# Patient Record
Sex: FEMALE | Race: WHITE | NOT HISPANIC OR LATINO | Employment: OTHER | URBAN - METROPOLITAN AREA
[De-identification: names, ages, dates, MRNs, and addresses within clinical notes are randomized per-mention and may not be internally consistent; named-entity substitution may affect disease eponyms.]

---

## 2018-04-05 ENCOUNTER — CLINICAL SUPPORT (OUTPATIENT)
Dept: GENETICS | Facility: HOSPITAL | Age: 73
End: 2018-04-05
Attending: INTERNAL MEDICINE
Payer: MEDICARE

## 2018-04-05 DIAGNOSIS — Z71.83 ENCOUNTER FOR NONPROCREATIVE GENETIC COUNSELING: Primary | ICD-10-CM

## 2018-04-05 DIAGNOSIS — Z85.038 HISTORY OF COLON CANCER: ICD-10-CM

## 2018-04-05 PROCEDURE — 96040 HC GENETICS COUNSELING SESSIONS: CPT | Mod: GY

## 2018-04-05 NOTE — PROGRESS NOTES
Patient Name:  Concepcion Romero  : 1945    Indication for Appointment:  Concepcion Romero presented for genetic counseling and cancer risk assessment due to a personal history of colorectal cancer. Concepcion Romero was referred by Jae Xiong MD and came to the session alone.    Personal History:   Concepcion Romero is 72 y.o. female of [place] descent with primary visit diagnosis of Encounter for nonprocreative genetic counseling [Z71.83].    Medical History:  Concepcion Romero  has a past medical history of Basal cell carcinoma; Colon cancer (CMS/HCC) (HCC); HBP (high blood pressure); High cholesterol; Melanoma (CMS/HCC) (HCC); Neuropathy (CMS/HCC); and Screening for breast cancer.     Past Surgical History:   Procedure Laterality Date   • BOWEL RESECTION     • COLONOSCOPY      2 total colonoscopy    • TUBAL LIGATION        Gynecologic History:  Age at menarche:  12  Age at first live birth:  28  Age at menopause:  48    Current Medications:  Concepcion Romero currently has no medications in their medication list.    Social History:  Concepcion Romero  reports that she has quit smoking. She smoked 1.00 pack per day. She has never used smokeless tobacco. She reports that she drinks alcohol.I    Family History:  See completed family history in pedigree.           Genetic Education/Risk Assessment/Counseling:  Information was provided about the relationship between genes and cancer.  The concept of hereditary cancer was defined.  Natural history, risks and inheritance patterns of  cancer-associated genes were reviewed, as related to Concepcion Romero’s personal and/or family history.  Related psychosocial aspects were discussed.    Discussion of Genetic Testing:  The pros, cons, and limitations of testing for genetic susceptibility were discussed, including but not limited to test options, possible results, potential impact on management, and psychosocial aspects.  There may be limited data on the degree of cancer  risk and/or no defined management guidelines associated with some genes.  If applicable, risk assessment models and/or published tables were used to provide a mutation probability estimate. Limitations of assessment were reviewed.    Given the reported personal and/or family history, genetic testing genetic testing was offered and accepted. The following testing was ordered:    [facility]  Colorectal Cancers Panel      Plan:     Concepcion Romero was confirmed to have understood the aforementioned information and was assisted with decision making as needed.  Informational and supportive resources were provided. Consent was obtained to share chart note(s) with physicians. Concepcion Romero is encouraged to contact the program with personal/family history updates. Concepcion Romero will be contacted via telephone when genetic test results are available.     A total of 30 minutes was spent providing genetic counseling to Concepcion Romero.

## 2018-04-05 NOTE — LETTER
04/05/18    Jae Xiong MD  [address]  [address]  [facility] [address]      Dear Dr. Xiong,    Thank you for referring your patient, Concepcion Romero, to receive care in my office. I have enclosed a summary of the care provided to Concepcion JONES on 04/05/18.    Please contact me with any questions you may have regarding the visit.    Sincerely,         Dianne Watkins, [facility]  [address] [facility] [address] [phone]    CC: Curt Kinsey MD

## 2018-04-09 ENCOUNTER — TELEPHONE (OUTPATIENT)
Dept: PRIMARY CARE | Facility: CLINIC | Age: 73
End: 2018-04-09

## 2018-04-09 RX ORDER — TRIAMTERENE/HYDROCHLOROTHIAZID 37.5-25 MG
1 TABLET ORAL DAILY
Qty: 90 TABLET | Refills: 2 | Status: SHIPPED | OUTPATIENT
Start: 2018-04-09 | End: 2018-12-31 | Stop reason: SDUPTHER

## 2018-04-09 RX ORDER — GABAPENTIN 300 MG/1
2 CAPSULE ORAL DAILY
COMMUNITY
Start: 2018-02-02 | End: 2018-04-09 | Stop reason: SDUPTHER

## 2018-04-09 RX ORDER — BISOPROLOL FUMARATE 5 MG/1
5 TABLET, FILM COATED ORAL DAILY
Qty: 90 TABLET | Refills: 2 | Status: SHIPPED | OUTPATIENT
Start: 2018-04-09 | End: 2018-12-31 | Stop reason: SDUPTHER

## 2018-04-09 RX ORDER — SIMVASTATIN 20 MG/1
1 TABLET, FILM COATED ORAL DAILY
COMMUNITY
Start: 2017-07-24 | End: 2018-04-09 | Stop reason: SDUPTHER

## 2018-04-09 RX ORDER — BISOPROLOL FUMARATE 5 MG/1
1 TABLET, FILM COATED ORAL DAILY
COMMUNITY
Start: 2017-07-24 | End: 2018-04-09 | Stop reason: SDUPTHER

## 2018-04-09 RX ORDER — GABAPENTIN 300 MG/1
600 CAPSULE ORAL DAILY
Qty: 180 CAPSULE | Refills: 2 | Status: SHIPPED | OUTPATIENT
Start: 2018-04-09 | End: 2018-05-30

## 2018-04-09 RX ORDER — SIMVASTATIN 20 MG/1
20 TABLET, FILM COATED ORAL DAILY
Qty: 90 TABLET | Refills: 2 | Status: SHIPPED | OUTPATIENT
Start: 2018-04-09 | End: 2018-12-31 | Stop reason: SDUPTHER

## 2018-04-09 RX ORDER — TRIAMTERENE/HYDROCHLOROTHIAZID 37.5-25 MG
1 TABLET ORAL DAILY
COMMUNITY
End: 2018-04-09 | Stop reason: SDUPTHER

## 2018-04-09 NOTE — TELEPHONE ENCOUNTER
Patient called today asking for Refills of meds that were RX'ed by her old [facility] Simvastatin 20 mg Once a Day - Bisoprolol- Fumarate tabs 5 mg once a day , Triamterene HCTZ 37.5/25 mg tabs once daily , Gabapentin 300mg 2 tabs once a day. all 90 day supply to [facility]

## 2018-04-17 ENCOUNTER — TELEPHONE (OUTPATIENT)
Dept: GENETICS | Facility: HOSPITAL | Age: 73
End: 2018-04-17

## 2018-04-17 NOTE — TELEPHONE ENCOUNTER
The patient did not answer the phone. I left a message asking to be called back at their earliest convenience.

## 2018-04-18 ENCOUNTER — TELEPHONE (OUTPATIENT)
Dept: GENETICS | Facility: HOSPITAL | Age: 73
End: 2018-04-18

## 2018-04-18 ENCOUNTER — DOCUMENTATION (OUTPATIENT)
Dept: GENETICS | Age: 73
End: 2018-04-18

## 2018-04-18 LAB
LYMPH SUBSET INTERP BLD FC-IMP: NORMAL
SCAN RESULT: NORMAL

## 2018-04-18 NOTE — LETTER
04/18/18    Jae Xiong MD  [address]  [address]  [facility] [address]      Dear Dr. Xiong,    Thank you for referring your patient, Concepcion Romero, to receive care in my office. I have enclosed a summary of the care provided to Concepcion JONES on 04/18/18.    Please contact me with any questions you may have regarding the visit.    Sincerely,         Dianne Watkins, [facility]  [address] [facility] [address] [phone]    CC: No Recipients

## 2018-04-18 NOTE — LETTER
04/18/18    Concepcion A Heather  [address]  [place] [address]      Dear Ms. Romero,    Thank you for participating in the Cancer Risk Assessment and Genetics Program.  It was a pleasure working with you. Attached is documentation from our discussion(s). It will also be sent to any physicians you indicated.      You may also choose to share this documentation with your family members. Because cancer risk is based on both personal and both maternal and paternal family history factors, as well as mutation status, your relatives are recommended to review their risks and genetic testing options (if applicable) with their own healthcare providers to derive a risk-appropriate, individualized plan.      If you have any questions, concerns, or updates to your personal/family history, please contact the Cancer Risk Assessment and Genetics Program at [phone] to further review your case.    Sincerely,         Dianne Watkins, [facility]  [address] [facility] [address] [phone]

## 2018-04-18 NOTE — TELEPHONE ENCOUNTER
Patient Name:  Concepcion Romero  : 1945      Indication for Appointment:  Concepcion Romero was referred to the Cancer Risk Assessment and Genetics Program due to  family history of colorectal cancer. Genetic testing was performed.    Concepcion Romero was contacted by telephone today to discuss genetic test results, risk-based management guidelines and any potential additional test options. Follow up appointments to discuss the results in more detail with our medical director may be scheduled by contacting the Cancer Risk Assessment and Genetics Program.    Genetic Test Results:    RESULT:    Negative - No Clinically Significant Mutation Identified  LAB/TEST:  [facility]  Colorectal Cancers Panel      The following genes were evaluated for sequence changes and exonic deletions/duplications:  APC, AXIN2, BMPR1A, CDH1, CHEK2, EPCAM (EPCAM: Deletion/duplication testing only (NM_002354.2).), GREM1 (GREM1:  Promoter region deletion/duplication testing only.), MLH1, MSH2, MSH3, MSH6, MUTYH, PMS2, POLD1, POLE, PTEN, SMAD4,  STK11, TP53  The following genes were evaluated for sequence changes only:  NTHL1 (NTHL1: Deletion/duplication analysis is not offered for this gene ([id number]).)     After receiving consent, the following result(s) were disclosed to Concepcion Romero:     - No reportable alterations were identified by sequencing and/or deletion/duplication analysis as interpreted by this laboratory.  This is referred to as an indeterminate negative result.  A mutation could be present that cannot be detected by the current tests performed or in a gene not tested. Additionally, biological family members may have a mutation in any of the genes tested Concepcion Romero does not given the negative result.    Personal History:     Concepcion Romero is 72 y.o. female of [place] descent.    Medical History:  Concepcion Romero   Past Medical History:   Diagnosis Date   • Basal cell carcinoma     On nose   • Colon cancer  (CMS/HCC) (HCC)     Diagnosed at age 72   • HBP (high blood pressure)    • High cholesterol    • Melanoma (CMS/HCC) (HCC)     ON back   • Neuropathy (CMS/HCC)    • Screening for breast cancer     Annual mammograms          Past Surgical History:   Procedure Laterality Date   • BOWEL RESECTION     • COLONOSCOPY      2 total colonoscopy    • TUBAL LIGATION          Gynecologic History:  Age at menarche:  12  Age at first live birth:  28  Age at menopause:  48      Current Medications:  Concepcion KAREN DongRomero   Current Outpatient Prescriptions   Medication Sig   • bisoprolol (ZEBETA) 5 mg tablet Take 1 tablet (5 mg total) by mouth daily.   • gabapentin (NEURONTIN) 300 mg capsule Take 2 capsules (600 mg total) by mouth daily.   • simvastatin (ZOCOR) 20 mg tablet Take 1 tablet (20 mg total) by mouth daily.   • triamterene-hydrochlorothiazide (MAXZIDE-25) 37.5-25 mg per tablet Take 1 tablet by mouth daily.     No current facility-administered medications for this visit.        Social History:  Concepcion Dongadden   Social History   Substance Use Topics   • Smoking status: Former Smoker     Packs/day: 1.00   • Smokeless tobacco: Never Used      Comment: from age [age]   • Alcohol use Yes      Comment: 7-14 glasses of wine per week       Family History:  See complete family history in pedigree.          Risk Assessment and Management:  As a clinically actionable mutation was not identified by the current test method(s), the cancer risks and guidelines reviewed are based on the personal and/or family history provided. As guidelines continually change and the efficacy of screening for some cancers remains under investigation, Concepcion Romero is encouraged to review personal and family history with managing physician(s) regularly.     Breast Management  Concepcion Romero’s lifetime risk of developing breast cancer was calculated using the Tyrer-Cuzick model and is estimated to be that of the general population.  Breast cancer risk is  dynamic and can increase with age and other factors. A high lifetime risk for developing breast cancer is typically 20-25% or higher.    - National Comprehensive Cancer Network (NCCN) guidelines for breast cancer screening include: monthly self breast examinations, clinical breast examination performed by a physician every 12 months and annual mammogram beginning by age 40.      Gynecologic Management  Continue gynecologic exam annually or as directed by physician.    Gastrointestinal Management  Concepcion Romero is encouraged to continue with gastrointestinal cancer risk management as directed by treating physicians.     Dermatologic Management  Concepcion Romero is encouraged to practice skin protective behaviors, such as limiting direct sun exposure, using sunscreen, and pursuing annual skin screening by a physician is recommended.    General Management  - Annual physical examination is encouraged.    - Adherence to a healthy lifestyle, including body mass index (BMI) <25 obtained through balanced diet and exercise, limiting intake of alcoholic beverages to less than 1 drink per day (serving equals 1 ounce of liquor, 6 ounces of wine or 8 ounces of beer) and continued smoking cessation.  Currently, lung cancer screening with low dose CT scan should be considered in individuals age 50 and older with a 20+ pack year history.   As lung cancer screening guidelines are evolving, Concepcion Romero may contact her physician to discuss eligibility for screening.  - Of note, Concepcion Romero is encouraged to discuss the potential side effects of the treatment rendered for curative intent of cancer including but not limited to psychosocial and physical effects, second cancer risk, other health concerns.    Plan:  Cancer risks are based on personal history, as well as on maternal and paternal family histories, mutation status, and other factors.  Relatives are encouraged to consider risk assessment and/or genetic evaluation  to derive a risk-appropriate, individualized plan.  Should family member(s) be interested in genetic evaluation, the Cancer Risk Assessment and Genetics Program can provide consultation or help to find a genetic counselor in their area.    The information provided reflects current practice guidelines and may change with new medical discoveries/technology/updated personal or family history information.  Concepcion Romero was confirmed to have understood the aforementioned information and was assisted with decision making as needed.  Informational and supportive resources were provided.  Potential psychosocial ramifications related to test results were reviewed.  Consent was obtained to share chart note(s) with physicians.  Concepcion Romero plans to discuss the above information with physicians to determine an optimal risk management plan.    Concepcion Dongadden should contact the program with personal/family history updates as this could alter the guidelines provided and/or available test options and/or to inquire about new information specific to this case.   As stated, there may be other genes associated with cancer risk for which Concepcion Romero was not tested.  Concepcion Romero was encouraged to contact the genetics program at [phone] with any questions or concerns and/or to periodically review test options and related insurance coverage.

## 2018-04-18 NOTE — LETTER
04/18/18    Curt Kinsey MD  [address]  [place] [address]      Dear Dr. Kinsey,    I am writing to confirm that your patient, Concepcion Romero, received care in my office on 04/18/18. I have enclosed a summary of the care provided to Concepcion JONES for your reference.    Please contact me with any questions you may have regarding the visit.    Sincerely,         Dianne Watkins, [facility]  [address] [facility] [address] [phone]    CC: No Recipients

## 2018-04-26 ENCOUNTER — OFFICE VISIT (OUTPATIENT)
Dept: SURGERY | Facility: CLINIC | Age: 73
End: 2018-04-26
Payer: MEDICARE

## 2018-04-26 VITALS
HEIGHT: 64 IN | DIASTOLIC BLOOD PRESSURE: 77 MMHG | HEART RATE: 62 BPM | TEMPERATURE: 97.7 F | RESPIRATION RATE: 18 BRPM | BODY MASS INDEX: 32.44 KG/M2 | WEIGHT: 190 LBS | SYSTOLIC BLOOD PRESSURE: 131 MMHG

## 2018-04-26 DIAGNOSIS — C18.3 MALIGNANT NEOPLASM OF HEPATIC FLEXURE (CMS/HCC): Primary | ICD-10-CM

## 2018-04-26 PROCEDURE — 99213 OFFICE O/P EST LOW 20 MIN: CPT | Performed by: COLON & RECTAL SURGERY

## 2018-04-26 NOTE — LETTER
"  Dear Trinity,    I saw Concepcion Romero in the office today in follow-up. Please see my note below.    If you have any additional questions, please do not hesitate to call me.    Sincerely,    Hernando Xiong MD      _____________________________________      Colorectal Surgery Follow-up    Subjective     Concepcion Romero is a 72 y.o. 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  She is not having any diarrhea issues.  If anything, she gets occasional constipation.  She has not had any unexplained weight loss.  She has not had any nodules or pains at her incisions.  She has not had a follow-up CEA.    She had her genetic testing and no pathologic abnormalities were identified.  This goes along with the thought that the IHC abnormality we saw on her pathology was secondary to BRAF mutation.    Medical History:   Past Medical History:   Diagnosis Date   • Basal cell carcinoma     On nose   • Colon cancer (CMS/HCC) (HCC)     Diagnosed at age 72   • HBP (high blood pressure)    • High cholesterol    • Melanoma (CMS/HCC) (HCC)     ON back   • Neuropathy (CMS/HCC)    • Screening for breast cancer     Annual mammograms       Surgical History:   Past Surgical History:   Procedure Laterality Date   • BOWEL RESECTION     • COLONOSCOPY      2 total colonoscopy    • TUBAL LIGATION         Allergies: Ciprofloxacin    Current Medications:  •  bisoprolol  •  gabapentin  •  simvastatin  •  triamterene-hydrochlorothiazide    Objective     Physicial Exam  /77 (BP Location: Right forearm, Patient Position: Sitting)   Pulse 62   Temp 36.5 °C (97.7 °F) (Temporal)   Resp 18   Ht 1.626 m (5' 4\")   Wt 86.2 kg (190 lb)   BMI 32.61 kg/m²      Physical Exam   Constitutional: She is oriented to person, place, and time. She appears well-developed and well-nourished.   HENT:   Head: Normocephalic and atraumatic.   Neck: Normal range of motion. Neck supple. "   Cardiovascular: Normal rate, regular rhythm and normal heart sounds.    Pulmonary/Chest: Breath sounds normal. She has no wheezes.   Abdominal: Soft. She exhibits no distension and no mass. There is no tenderness.   Robotic incisions well-healed.  No nodules at the incisions   Musculoskeletal: Normal range of motion.   Lymphadenopathy:     She has no cervical adenopathy.        Right: No inguinal adenopathy present.        Left: No inguinal adenopathy present.   Neurological: She is alert and oriented to person, place, and time.   Skin: Skin is warm and dry. No rash noted.   Psychiatric: She has a normal mood and affect.   Vitals reviewed.          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 No adjuvant Therapy.  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 Negative genetic testing.         Current Assessment & Plan     Doing well at 3 months with no clinical sign of cancer recurrence.  Check CEA today.  Follow-up 3 months.         Relevant Orders    CEA                Jae Xiong MD

## 2018-04-26 NOTE — ASSESSMENT & PLAN NOTE
Doing well at 3 months with no clinical sign of cancer recurrence.  Check CEA today.  Follow-up 3 months.

## 2018-04-26 NOTE — PROGRESS NOTES
"Colorectal Surgery Follow-up    Subjective     Concepcion Romero is a 72 y.o. 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  She is not having any diarrhea issues.  If anything, she gets occasional constipation.  She has not had any unexplained weight loss.  She has not had any nodules or pains at her incisions.  She has not had a follow-up CEA.    She had her genetic testing and no pathologic abnormalities were identified.  This goes along with the thought that the IHC abnormality we saw on her pathology was secondary to BRAF mutation.    Medical History:   Past Medical History:   Diagnosis Date   • Basal cell carcinoma     On nose   • Colon cancer (CMS/HCC) (HCC)     Diagnosed at age 72   • HBP (high blood pressure)    • High cholesterol    • Melanoma (CMS/HCC) (HCC)     ON back   • Neuropathy (CMS/HCC)    • Screening for breast cancer     Annual mammograms       Surgical History:   Past Surgical History:   Procedure Laterality Date   • BOWEL RESECTION     • COLONOSCOPY      2 total colonoscopy    • TUBAL LIGATION         Allergies: Ciprofloxacin    Current Medications:  •  bisoprolol  •  gabapentin  •  simvastatin  •  triamterene-hydrochlorothiazide    Objective     Physicial Exam  /77 (BP Location: Right forearm, Patient Position: Sitting)   Pulse 62   Temp 36.5 °C (97.7 °F) (Temporal)   Resp 18   Ht 1.626 m (5' 4\")   Wt 86.2 kg (190 lb)   BMI 32.61 kg/m²     Physical Exam   Constitutional: She is oriented to person, place, and time. She appears well-developed and well-nourished.   HENT:   Head: Normocephalic and atraumatic.   Neck: Normal range of motion. Neck supple.   Cardiovascular: Normal rate, regular rhythm and normal heart sounds.    Pulmonary/Chest: Breath sounds normal. She has no wheezes.   Abdominal: Soft. She exhibits no distension and no mass. There is no tenderness.   Robotic incisions well-healed.  No nodules at the incisions "   Musculoskeletal: Normal range of motion.   Lymphadenopathy:     She has no cervical adenopathy.        Right: No inguinal adenopathy present.        Left: No inguinal adenopathy present.   Neurological: She is alert and oriented to person, place, and time.   Skin: Skin is warm and dry. No rash noted.   Psychiatric: She has a normal mood and affect.   Vitals reviewed.          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 No adjuvant Therapy.  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 Negative genetic testing.         Current Assessment & Plan     Doing well at 3 months with no clinical sign of cancer recurrence.  Check CEA today.  Follow-up 3 months.         Relevant Orders    CEA                Jae Xiong MD

## 2018-05-04 ENCOUNTER — TELEPHONE (OUTPATIENT)
Dept: SURGERY | Facility: CLINIC | Age: 73
End: 2018-05-04

## 2018-05-04 LAB — CEA SERPL-MCNC: 0.5 NG/ML

## 2018-05-11 ENCOUNTER — LAB REQUISITION (OUTPATIENT)
Dept: LAB | Facility: HOSPITAL | Age: 73
End: 2018-05-11
Attending: INTERNAL MEDICINE
Payer: MEDICARE

## 2018-05-11 DIAGNOSIS — C18.0 MALIGNANT NEOPLASM OF CECUM (CMS/HCC): ICD-10-CM

## 2018-05-11 DIAGNOSIS — D50.9 IRON DEFICIENCY ANEMIA: ICD-10-CM

## 2018-05-11 LAB
ALBUMIN SERPL-MCNC: 3.7 G/DL (ref 3.4–5)
ALP SERPL-CCNC: 63 IU/L (ref 35–126)
ALT SERPL-CCNC: 22 IU/L (ref 11–54)
ANION GAP SERPL CALC-SCNC: 10 MEQ/L (ref 3–15)
AST SERPL-CCNC: 29 IU/L (ref 15–41)
BASOPHILS # BLD: 0.03 K/UL (ref 0.01–0.1)
BASOPHILS NFR BLD: 0.6 %
BILIRUB SERPL-MCNC: 0.5 MG/DL (ref 0.3–1.2)
BUN SERPL-MCNC: 10 MG/DL (ref 8–20)
CALCIUM SERPL-MCNC: 9.9 MG/DL (ref 8.9–10.3)
CHLORIDE SERPL-SCNC: 97 MMOL/L (ref 98–109)
CO2 SERPL-SCNC: 25 MMOL/L (ref 22–32)
CREAT SERPL-MCNC: 0.9 MG/DL (ref 0.6–1.1)
DIFFERENTIAL METHOD BLD: ABNORMAL
EOSINOPHIL # BLD: 0.11 K/UL (ref 0.04–0.36)
EOSINOPHIL NFR BLD: 2.3 %
ERYTHROCYTE [DISTWIDTH] IN BLOOD BY AUTOMATED COUNT: 14.5 % (ref 11.7–14.4)
GFR SERPL CREATININE-BSD FRML MDRD: >60 ML/MIN/1.73M*2
GLUCOSE SERPL-MCNC: 132 MG/DL (ref 70–99)
HCT VFR BLDCO AUTO: 38.3 % (ref 35–45)
HGB BLD-MCNC: 13.1 G/DL (ref 11.8–15.7)
IMM GRANULOCYTES # BLD AUTO: 0 K/UL (ref 0–0.08)
IMM GRANULOCYTES NFR BLD AUTO: 0 %
LYMPHOCYTES # BLD: 1.15 K/UL (ref 1.2–3.5)
LYMPHOCYTES NFR BLD: 24.4 %
MCH RBC QN AUTO: 28.4 PG (ref 28–33.2)
MCHC RBC AUTO-ENTMCNC: 34.2 G/DL (ref 32.2–35.5)
MCV RBC AUTO: 83.1 FL (ref 83–98)
MONOCYTES # BLD: 0.45 K/UL (ref 0.28–0.8)
MONOCYTES NFR BLD: 9.5 %
NEUTROPHILS # BLD: 2.98 K/UL (ref 1.7–7)
NEUTS SEG NFR BLD: 63.2 %
NRBC BLD-RTO: 0 %
PDW BLD AUTO: 8.7 FL (ref 9.4–12.3)
PLATELET # BLD AUTO: 159 K/UL (ref 150–369)
POTASSIUM SERPL-SCNC: 3.7 MMOL/L (ref 3.6–5.1)
PROT SERPL-MCNC: 5.9 G/DL (ref 6–8.2)
RBC # BLD AUTO: 4.61 M/UL (ref 3.93–5.22)
SODIUM SERPL-SCNC: 132 MMOL/L (ref 136–144)
WBC # BLD AUTO: 4.72 K/UL (ref 3.8–10.5)

## 2018-05-11 PROCEDURE — 80053 COMPREHEN METABOLIC PANEL: CPT | Performed by: INTERNAL MEDICINE

## 2018-05-11 PROCEDURE — 85025 COMPLETE CBC W/AUTO DIFF WBC: CPT | Performed by: INTERNAL MEDICINE

## 2018-05-11 PROCEDURE — 36415 COLL VENOUS BLD VENIPUNCTURE: CPT | Performed by: INTERNAL MEDICINE

## 2018-05-21 RX ORDER — ZOLPIDEM TARTRATE 6.25 MG/1
TABLET, FILM COATED, EXTENDED RELEASE ORAL
Qty: 30 TABLET | Refills: 2 | Status: SHIPPED | OUTPATIENT
Start: 2018-05-21 | End: 2018-05-24

## 2018-05-24 ENCOUNTER — TELEPHONE (OUTPATIENT)
Dept: PRIMARY CARE | Facility: CLINIC | Age: 73
End: 2018-05-24

## 2018-05-24 ENCOUNTER — OFFICE VISIT (OUTPATIENT)
Dept: NEUROLOGY | Facility: CLINIC | Age: 73
End: 2018-05-24
Payer: MEDICARE

## 2018-05-24 VITALS — SYSTOLIC BLOOD PRESSURE: 142 MMHG | RESPIRATION RATE: 18 BRPM | HEART RATE: 61 BPM | DIASTOLIC BLOOD PRESSURE: 84 MMHG

## 2018-05-24 DIAGNOSIS — R27.0 ATAXIA: ICD-10-CM

## 2018-05-24 DIAGNOSIS — R53.1 WEAKNESS: ICD-10-CM

## 2018-05-24 DIAGNOSIS — R52 PAIN: Primary | ICD-10-CM

## 2018-05-24 DIAGNOSIS — R20.0 NUMBNESS: ICD-10-CM

## 2018-05-24 PROCEDURE — 99214 OFFICE O/P EST MOD 30 MIN: CPT | Performed by: PSYCHIATRY & NEUROLOGY

## 2018-05-24 RX ORDER — ZOLPIDEM TARTRATE 5 MG/1
5 TABLET ORAL NIGHTLY PRN
Qty: 30 TABLET | Refills: 2 | Status: SHIPPED | OUTPATIENT
Start: 2018-05-24 | End: 2019-03-15 | Stop reason: SDUPTHER

## 2018-05-24 RX ORDER — B-COMPLEX WITH VITAMIN C
1 TABLET ORAL DAILY
COMMUNITY

## 2018-05-24 RX ORDER — ASPIRIN 81 MG/1
81 TABLET ORAL DAILY
COMMUNITY
End: 2018-09-26

## 2018-05-24 RX ORDER — CHOLECALCIFEROL (VITAMIN D3) 25 MCG
1000 TABLET ORAL DAILY
COMMUNITY
End: 2018-11-07

## 2018-05-24 NOTE — ASSESSMENT & PLAN NOTE
The patient reported feeling weak and off balance.  I have arranged for her to attend physical therapy for not only strengthen conditioning but special attention to her gait and balance.

## 2018-05-24 NOTE — ASSESSMENT & PLAN NOTE
The patient has a small fiber polyneuropathy and radiculopathy.  I do not see the need for additional diagnostics.

## 2018-05-24 NOTE — ASSESSMENT & PLAN NOTE
The patient has multifactorial imbalance.  In the past a comprehensive workup was unremarkable.  She will attend physical therapy.

## 2018-05-24 NOTE — TELEPHONE ENCOUNTER
Patient calling today , was prescribed Zolpidem CR 6.25 mg CR Tablet- this was denied by insurance and kenneth was able to get this approved for patient, Patient went to pharmacy and medication is 56 dollars and she can not afford that. Patient is asking if you are able to change this RX and send in Zolpidem Tartrate 5 mg tabs that she believes will be cheaper for her and send that to her pharmacy on file which is [facility] in [place] not her Mail order. . Please advise.

## 2018-05-24 NOTE — ASSESSMENT & PLAN NOTE
The patient is suffering from neuropathic pain.  She has had a modest improvement in her condition on gabapentin 600 mg nightly.  I recommended that she increase her dose to 300/600 daily and we will further titrated as tolerated.  If ineffective we could consider switching to Lyrica, Cymbalta or another neuropathic pain medication.    The patient reported that family members have brought up the idea of medicinal marijuana.  While I am not a prescriber, I think this could be a reasonable option assuming medications are ineffective.

## 2018-05-24 NOTE — LETTER
May 24, 2018     Trinity Dubon MD  [address] [facility] [address]  [facility] [address]    Patient: Concepcion Romero   YOB: 1945   Date of Visit: 5/24/2018       Dear Dr. Dubon:    Thank you for referring Concepcion Romero to me for evaluation. Below are my notes for this consultation.    If you have questions, please do not hesitate to call me. I look forward to following your patient along with you.         Sincerely,        Mau Montoya MD        CC: MD Mau Crawley MD  5/24/2018  4:35 PM  Signed  Concepcion Romero is a 72 y.o. female  5/24/2018  Trinity Dubon MD    Neurology Follow Up Note    Subjective     Concepcion Romero is a 72 y.o. female who is being evaluated  for pain and numbness.  I previously saw the patient 2-1/2 years ago.  At that time it was my impression that she was suffering from a combination of small fiber polyneuropathy and radiculopathy.  I recommended physical therapy and titrating her gabapentin.    Since her last visit, overall the patient reported that she had been doing relatively well.  She remains on gabapentin and only take 600 mg nightly.  She feels as though sometimes the medication can result in excessive fatigue.  Since her last visit, the patient was diagnosed with colon cancer from anemia.  She had surgery and is 100% in remission.  She did not require anything other than surgery.    The patient reported that she has mild nonradiating neck and shoulder pain.  She has numbness and tingling in her hands.  She denied weakness or clumsiness.  She has chronic nonradiating lower back pain.  She has numbness and tingling in the feet.  Her legs do not feel weak although they do feel heavy and clumsy.  She has imbalance and uses a cane.  She has not fallen.  She did go to physical therapy in the past which was enjoyable, informative and helpful.    The patient reported that she has what she believes to be neuropathic pain in her distal feet and  toes.  She reported a sharp stabbing pain or electrical shock.  This typically comes in the evening and is made worse by standing or walking.  The patient was evaluated by physical medicine and rehabilitation and was given a walker although she often walks with a cane.  She tries to go out and exercise by walking at [employer] 3 times a week.  Comprehensive review of systems was otherwise unremarkable.  There were no symptoms to suggest increased intracranial pressure, meningitis or systemic illness.    Review of Systems  Constitutional: negative  Eyes: negative  Ears, nose, mouth, throat, and face: negative  Respiratory: negative  Cardiovascular: negative  Gastrointestinal: negative  Genitourinary:negative  Integument/breast: negative  Hematologic/lymphatic: negative  Musculoskeletal:negative  Neurological: negative  Behavioral/Psych: negative  Endocrine: negative  Allergic/Immunologic: negative    Current Outpatient Prescriptions   Medication Sig Dispense Refill   • aspirin 81 mg enteric coated tablet Take 81 mg by mouth daily.     • B-complex with vitamin C tablet Take 1 tablet by mouth daily.     • cholecalciferol, vitamin D3, 1,000 unit tablet Take 1,000 Units by mouth daily.     • gabapentin (NEURONTIN) 300 mg capsule Take 2 capsules (600 mg total) by mouth daily. (Patient taking differently: Take 600 mg by mouth nightly.  ) 180 capsule 2   • multivit-min-FA-lycopen-lutein tablet Take 1 tablet by mouth daily.     • bisoprolol (ZEBETA) 5 mg tablet Take 1 tablet (5 mg total) by mouth daily. 90 tablet 2   • simvastatin (ZOCOR) 20 mg tablet Take 1 tablet (20 mg total) by mouth daily. 90 tablet 2   • triamterene-hydrochlorothiazide (MAXZIDE-25) 37.5-25 mg per tablet Take 1 tablet by mouth daily. 90 tablet 2   • zolpidem (AMBIEN) 5 mg tablet Take 1 tablet (5 mg total) by mouth nightly as needed for sleep. 30 tablet 2     No current facility-administered medications for this visit.        PMH/SH/FH : Unchanged since  previous visit.    Objective     Physical Exam  BP (!) 142/84   Pulse 61   Resp 18     General Appearance:  Alert, no distress, appears stated age   Neck: There were no carotid bruits   Heart Regular rate and rhythm, S1 and S2 normal, no murmur, rub or gallop   Extremities: Extremities normal, atraumatic, no cyanosis or edema    Neurologic Exam:  Alert and oriented. Attention, concentration, memory, language, visual spatial orientation, executive function is normal.    Pupils equal round and reactive to light. Extraocular movement full with normal pursuit + saccades. No nystagmus noted.   Facial strength and sensation is normal. Hearing normal.  The tongue and uvula were midline. No dysarthria or dysphagia.   Strength was 5/5 in bulbar, axial + extremity muscles.There was normal bulk and tone with no abnormal movements.   Small fiber sensory modalities were reduced in the feet.  There was no dysmetria or cerebellar signs.   The gait was narrow based.  Reflexes were + in the arms and absent in the legs.. Hill sign was negative. Plantar responses were flexor.         Problem List     Pain - Primary    Current Assessment & Plan     The patient is suffering from neuropathic pain.  She has had a modest improvement in her condition on gabapentin 600 mg nightly.  I recommended that she increase her dose to 300/600 daily and we will further titrated as tolerated.  If ineffective we could consider switching to Lyrica, Cymbalta or another neuropathic pain medication.    The patient reported that family members have brought up the idea of medicinal marijuana.  While I am not a prescriber, I think this could be a reasonable option assuming medications are ineffective.         Numbness    Current Assessment & Plan     The patient has a small fiber polyneuropathy and radiculopathy.  I do not see the need for additional diagnostics.         Weakness    Current Assessment & Plan     The patient reported feeling weak and off  balance.  I have arranged for her to attend physical therapy for not only strengthen conditioning but special attention to her gait and balance.         Ataxia    Current Assessment & Plan     The patient has multifactorial imbalance.  In the past a comprehensive workup was unremarkable.  She will attend physical therapy.         Relevant Orders    Ambulatory referral to Physical Therapy      I spent greater than 25 minutes with the patient during which greater than 50% of the time was spent counseling and coordinating her care.    It was a real pleasure treating Concepcion Romero today, thank you for allowing me to participate in the medical care. If you have any questions, please call me at any time. Concepcion Romero will follow up with me in the coming weeks to months and keep me updated by telephone. Concepcion Romero knows to notify me immediately if there is any change in the condition or if there are any new symptoms of transient or static neurologic dysfunction.    Mau Montoya MD

## 2018-05-30 ENCOUNTER — OFFICE VISIT (OUTPATIENT)
Dept: PRIMARY CARE | Facility: CLINIC | Age: 73
End: 2018-05-30
Payer: MEDICARE

## 2018-05-30 VITALS
SYSTOLIC BLOOD PRESSURE: 120 MMHG | BODY MASS INDEX: 34.57 KG/M2 | DIASTOLIC BLOOD PRESSURE: 80 MMHG | HEART RATE: 69 BPM | WEIGHT: 201.4 LBS | OXYGEN SATURATION: 97 % | TEMPERATURE: 97.8 F | RESPIRATION RATE: 16 BRPM

## 2018-05-30 DIAGNOSIS — Z12.39 BREAST CANCER SCREENING: ICD-10-CM

## 2018-05-30 DIAGNOSIS — I10 ESSENTIAL HYPERTENSION: Primary | ICD-10-CM

## 2018-05-30 PROBLEM — C18.9 COLON CANCER (CMS/HCC): Status: ACTIVE | Noted: 2018-05-30

## 2018-05-30 PROBLEM — G60.9 IDIOPATHIC PERIPHERAL NEUROPATHY: Status: ACTIVE | Noted: 2017-06-08

## 2018-05-30 PROBLEM — I42.9 CARDIOMYOPATHY (CMS/HCC): Status: ACTIVE | Noted: 2017-06-08

## 2018-05-30 PROBLEM — I25.10 CHRONIC CORONARY ARTERY DISEASE: Status: ACTIVE | Noted: 2017-06-08

## 2018-05-30 PROCEDURE — 99213 OFFICE O/P EST LOW 20 MIN: CPT | Performed by: FAMILY MEDICINE

## 2018-05-30 RX ORDER — GABAPENTIN 300 MG/1
600 CAPSULE ORAL NIGHTLY
COMMUNITY
Start: 2018-05-30 | End: 2019-03-29

## 2018-05-30 ASSESSMENT — ENCOUNTER SYMPTOMS
PSYCHIATRIC NEGATIVE: 1
FATIGUE: 0
SLEEP DISTURBANCE: 0
SHORTNESS OF BREATH: 0
FEVER: 0

## 2018-05-30 NOTE — PROGRESS NOTES
Subjective      Patient ID: Concepcion Romero is a 72 y.o. female.    HPI   Here for bp ck  Doing well s/p R hemicolectomy for colon CA  Feels well, bowels mostly back to normal  Recently saw neurology and gabapentin increased for her chronic PN  Will be going to PT soon for this, having some balance problems  Requests rx for mammo today      The following have been reviewed and updated as appropriate in this visit:  Tobacco  Problems  Med Hx  Surg Hx  Fam Hx  Soc Hx      Review of Systems   Constitutional: Negative for fatigue and fever.   Respiratory: Negative for shortness of breath.    Cardiovascular: Negative for chest pain.   Psychiatric/Behavioral: Negative.  Negative for sleep disturbance.       Objective     Vitals:    05/30/18 1515 05/30/18 1540   BP: 120/80 120/80   BP Location: Left upper arm    Pulse: 69    Resp: 16    Temp: 36.6 °C (97.8 °F)    TempSrc: Oral    SpO2: 97%    Weight: 91.4 kg (201 lb 6.4 oz)      Body mass index is 34.57 kg/m².    Physical Exam   Constitutional: She is oriented to person, place, and time. She appears well-developed and well-nourished.   HENT:   Head: Normocephalic and atraumatic.   Right Ear: External ear normal.   Left Ear: External ear normal.   Eyes: Conjunctivae are normal.   Neck: Neck supple. Carotid bruit is not present. No thyromegaly present.   Cardiovascular: Normal rate, regular rhythm and normal heart sounds.    Pulmonary/Chest: Effort normal and breath sounds normal. She has no wheezes. She has no rales.   Abdominal: Soft. Bowel sounds are normal.   Musculoskeletal: Normal range of motion. She exhibits no edema.   Lymphadenopathy:     She has no cervical adenopathy.   Neurological: She is alert and oriented to person, place, and time.   Psychiatric: She has a normal mood and affect. Her behavior is normal.   Vitals reviewed.      Assessment/Plan   Problem List Items Addressed This Visit        Other    HTN (hypertension) - Primary     bp perfect  Cont  same  F/u mid sept sub AWV  Flu shot           Other Visit Diagnoses     Breast cancer screening        Relevant Orders    BI SCREENING MAMMOGRAM BILATERAL

## 2018-06-18 DIAGNOSIS — R53.1 WEAKNESS: ICD-10-CM

## 2018-06-18 DIAGNOSIS — R27.0 ATAXIA: Primary | ICD-10-CM

## 2018-06-19 ENCOUNTER — TELEPHONE (OUTPATIENT)
Dept: NEUROLOGY | Facility: CLINIC | Age: 73
End: 2018-06-19

## 2018-06-19 NOTE — TELEPHONE ENCOUNTER
Concepcion is doing well on the increased dose of Gabapentin. She is a little foggy, but can tolerate it, and knows that side effect will subside in a few more weeks. Just an update, no need to call her back. Additionally, she is in physical therapy as you recommended.

## 2018-07-12 ENCOUNTER — HOSPITAL ENCOUNTER (OUTPATIENT)
Dept: RADIOLOGY | Age: 73
Discharge: HOME | End: 2018-07-12
Attending: FAMILY MEDICINE
Payer: MEDICARE

## 2018-07-12 DIAGNOSIS — Z12.39 BREAST CANCER SCREENING: ICD-10-CM

## 2018-07-12 PROCEDURE — 77067 SCR MAMMO BI INCL CAD: CPT

## 2018-07-16 ENCOUNTER — OFFICE VISIT (OUTPATIENT)
Dept: SURGERY | Facility: CLINIC | Age: 73
End: 2018-07-16
Payer: MEDICARE

## 2018-07-16 ENCOUNTER — LAB REQUISITION (OUTPATIENT)
Dept: LAB | Facility: HOSPITAL | Age: 73
End: 2018-07-16
Attending: INTERNAL MEDICINE
Payer: MEDICARE

## 2018-07-16 VITALS
DIASTOLIC BLOOD PRESSURE: 70 MMHG | TEMPERATURE: 97.3 F | WEIGHT: 201 LBS | HEIGHT: 64 IN | SYSTOLIC BLOOD PRESSURE: 120 MMHG | BODY MASS INDEX: 34.31 KG/M2

## 2018-07-16 DIAGNOSIS — C18.0 MALIGNANT NEOPLASM OF CECUM (CMS/HCC): ICD-10-CM

## 2018-07-16 DIAGNOSIS — D50.9 IRON DEFICIENCY ANEMIA: ICD-10-CM

## 2018-07-16 DIAGNOSIS — C18.3 MALIGNANT NEOPLASM OF HEPATIC FLEXURE (CMS/HCC): Primary | ICD-10-CM

## 2018-07-16 LAB
BASOPHILS # BLD: 0.02 K/UL (ref 0.01–0.1)
BASOPHILS NFR BLD: 0.4 %
CEA SERPL-MCNC: 1.1 NG/ML
DIFFERENTIAL METHOD BLD: NORMAL
EOSINOPHIL # BLD: 0.09 K/UL (ref 0.04–0.36)
EOSINOPHIL NFR BLD: 1.7 %
ERYTHROCYTE [DISTWIDTH] IN BLOOD BY AUTOMATED COUNT: 13.2 % (ref 11.7–14.4)
HCT VFR BLDCO AUTO: 40.7 % (ref 35–45)
HGB BLD-MCNC: 14.2 G/DL (ref 11.8–15.7)
IMM GRANULOCYTES # BLD AUTO: 0.01 K/UL (ref 0–0.08)
IMM GRANULOCYTES NFR BLD AUTO: 0.2 %
LYMPHOCYTES # BLD: 1.34 K/UL (ref 1.2–3.5)
LYMPHOCYTES NFR BLD: 25.6 %
MCH RBC QN AUTO: 30.5 PG (ref 28–33.2)
MCHC RBC AUTO-ENTMCNC: 34.9 G/DL (ref 32.2–35.5)
MCV RBC AUTO: 87.5 FL (ref 83–98)
MONOCYTES # BLD: 0.48 K/UL (ref 0.28–0.8)
MONOCYTES NFR BLD: 9.2 %
NEUTROPHILS # BLD: 3.29 K/UL (ref 1.7–7)
NEUTS SEG NFR BLD: 62.9 %
NRBC BLD-RTO: 0 %
PDW BLD AUTO: 8.6 FL (ref 9.4–12.3)
PLATELET # BLD AUTO: 170 K/UL (ref 150–369)
RBC # BLD AUTO: 4.65 M/UL (ref 3.93–5.22)
WBC # BLD AUTO: 5.23 K/UL (ref 3.8–10.5)

## 2018-07-16 PROCEDURE — 82378 CARCINOEMBRYONIC ANTIGEN: CPT | Performed by: INTERNAL MEDICINE

## 2018-07-16 PROCEDURE — 36415 COLL VENOUS BLD VENIPUNCTURE: CPT | Performed by: INTERNAL MEDICINE

## 2018-07-16 PROCEDURE — 99213 OFFICE O/P EST LOW 20 MIN: CPT | Performed by: COLON & RECTAL SURGERY

## 2018-07-16 PROCEDURE — 85025 COMPLETE CBC W/AUTO DIFF WBC: CPT | Performed by: INTERNAL MEDICINE

## 2018-07-16 NOTE — ASSESSMENT & PLAN NOTE
No clinical sign of cancer recurrence.  CEA is normal.  This gets repeated in August.  She will see me back in 3 months.

## 2018-07-16 NOTE — LETTER
"  Dear Trinity,    I saw Concepcion Romero in the office today in follow-up. Please see my note below.    If you have any additional questions, please do not hesitate to call me.    Sincerely,    Hernando Xiong MD      _____________________________________      Colorectal Surgery Follow-up    Subjective     Concepcion Romero is a 73 y.o. female who is 6 months out from her robotic right colectomy for her very bulky stage II (T3, N0) right colon cancer.  She did not have adjuvant therapy.  She is not having any diarrhea problems, and in fact gets intermittent constipation depending on her fiber intake.  She has no unexplained weight loss.  Her last CEA was 0.5 in May.    She gets occasional twinges of discomfort in the right subcostal area.  This is short-lived and not particularly bothersome.    Medical History:   Past Medical History:   Diagnosis Date   • Basal cell carcinoma     On nose   • Colon cancer (CMS/HCC) (HCC)     Diagnosed at age 72   • HBP (high blood pressure)    • High cholesterol    • Melanoma (CMS/HCC) (HCC)     ON back   • Neuropathy (CMS/HCC)    • Screening for breast cancer     Annual mammograms       Surgical History:   Past Surgical History:   Procedure Laterality Date   • BOWEL RESECTION     • COLONOSCOPY      2 total colonoscopy    • MOHS SURGERY  03/2018   • TUBAL LIGATION         Allergies: Ciprofloxacin    Current Medications:  •  aspirin  •  B-complex with vitamin C  •  bisoprolol  •  cholecalciferol (vitamin D3)  •  gabapentin  •  multivit-min-FA-lycopen-lutein  •  simvastatin  •  triamterene-hydrochlorothiazide  •  zolpidem    Objective     Physicial Exam  /70   Temp 36.3 °C (97.3 °F)   Ht 1.626 m (5' 4\")   Wt 91.2 kg (201 lb)   BMI 34.50 kg/m²      Physical Exam   Constitutional: She is oriented to person, place, and time. She appears well-developed and well-nourished.   HENT:   Head: Normocephalic and atraumatic.   Neck: Normal range of motion. Neck supple. "   Cardiovascular: Normal rate, regular rhythm and normal heart sounds.    Pulmonary/Chest: Breath sounds normal. She has no wheezes.   Abdominal: Soft. She exhibits no distension and no mass. There is no hepatomegaly. There is no tenderness.       Musculoskeletal: Normal range of motion.   Lymphadenopathy:     She has no cervical adenopathy.        Right: No inguinal adenopathy present.        Left: No inguinal adenopathy present.   Neurological: She is alert and oriented to person, place, and time.   Skin: Skin is warm and dry. No rash noted.   Psychiatric: She has a normal mood and affect.   Vitals reviewed.          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  s/p Robotic RHC 1/10/18 for bulky Hepatic flexure tumor  Stage II. No adjuvant Therapy.  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 Negative genetic testing.         Current Assessment & Plan     No clinical sign of cancer recurrence.  CEA is normal.  This gets repeated in August.  She will see me back in 3 months.         Relevant Orders    CEA                Jae Xiong MD

## 2018-07-16 NOTE — PROGRESS NOTES
"Colorectal Surgery Follow-up    Subjective     Concepcion Romero is a 73 y.o. female who is 6 months out from her robotic right colectomy for her very bulky stage II (T3, N0) right colon cancer.  She did not have adjuvant therapy.  She is not having any diarrhea problems, and in fact gets intermittent constipation depending on her fiber intake.  She has no unexplained weight loss.  Her last CEA was 0.5 in May.    She gets occasional twinges of discomfort in the right subcostal area.  This is short-lived and not particularly bothersome.    Medical History:   Past Medical History:   Diagnosis Date   • Basal cell carcinoma     On nose   • Colon cancer (CMS/HCC) (HCC)     Diagnosed at age 72   • HBP (high blood pressure)    • High cholesterol    • Melanoma (CMS/HCC) (HCC)     ON back   • Neuropathy (CMS/HCC)    • Screening for breast cancer     Annual mammograms       Surgical History:   Past Surgical History:   Procedure Laterality Date   • BOWEL RESECTION     • COLONOSCOPY      2 total colonoscopy    • MOHS SURGERY  03/2018   • TUBAL LIGATION         Allergies: Ciprofloxacin    Current Medications:  •  aspirin  •  B-complex with vitamin C  •  bisoprolol  •  cholecalciferol (vitamin D3)  •  gabapentin  •  multivit-min-FA-lycopen-lutein  •  simvastatin  •  triamterene-hydrochlorothiazide  •  zolpidem    Objective     Physicial Exam  /70   Temp 36.3 °C (97.3 °F)   Ht 1.626 m (5' 4\")   Wt 91.2 kg (201 lb)   BMI 34.50 kg/m²     Physical Exam   Constitutional: She is oriented to person, place, and time. She appears well-developed and well-nourished.   HENT:   Head: Normocephalic and atraumatic.   Neck: Normal range of motion. Neck supple.   Cardiovascular: Normal rate, regular rhythm and normal heart sounds.    Pulmonary/Chest: Breath sounds normal. She has no wheezes.   Abdominal: Soft. She exhibits no distension and no mass. There is no hepatomegaly. There is no tenderness.       Musculoskeletal: Normal range of " motion.   Lymphadenopathy:     She has no cervical adenopathy.        Right: No inguinal adenopathy present.        Left: No inguinal adenopathy present.   Neurological: She is alert and oriented to person, place, and time.   Skin: Skin is warm and dry. No rash noted.   Psychiatric: She has a normal mood and affect.   Vitals reviewed.          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  s/p Robotic RHC 1/10/18 for bulky Hepatic flexure tumor  Stage II. No adjuvant Therapy.  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 Negative genetic testing.         Current Assessment & Plan     No clinical sign of cancer recurrence.  CEA is normal.  This gets repeated in August.  She will see me back in 3 months.         Relevant Orders    CEA                Jae Xiong MD

## 2018-07-20 DIAGNOSIS — G60.9 NEUROPATHY, IDIOPATHIC: ICD-10-CM

## 2018-07-20 DIAGNOSIS — R53.1 WEAKNESS: ICD-10-CM

## 2018-07-20 DIAGNOSIS — R27.0 ATAXIA: Primary | ICD-10-CM

## 2018-08-21 DIAGNOSIS — R53.1 WEAKNESS: ICD-10-CM

## 2018-08-21 DIAGNOSIS — R27.0 ATAXIA: Primary | ICD-10-CM

## 2018-08-21 DIAGNOSIS — G60.9 IDIOPATHIC PERIPHERAL NEUROPATHY: ICD-10-CM

## 2018-08-23 ENCOUNTER — OFFICE VISIT (OUTPATIENT)
Dept: NEUROLOGY | Facility: CLINIC | Age: 73
End: 2018-08-23
Payer: MEDICARE

## 2018-08-23 VITALS — SYSTOLIC BLOOD PRESSURE: 110 MMHG | DIASTOLIC BLOOD PRESSURE: 78 MMHG | HEART RATE: 66 BPM | RESPIRATION RATE: 16 BRPM

## 2018-08-23 DIAGNOSIS — R53.1 WEAKNESS: ICD-10-CM

## 2018-08-23 DIAGNOSIS — R20.0 NUMBNESS: ICD-10-CM

## 2018-08-23 DIAGNOSIS — R52 PAIN: Primary | ICD-10-CM

## 2018-08-23 PROCEDURE — 99213 OFFICE O/P EST LOW 20 MIN: CPT | Performed by: PSYCHIATRY & NEUROLOGY

## 2018-08-23 NOTE — LETTER
August 23, 2018     Trinity Dubon MD  [address] [facility] [address]  [facility] [address]    Patient: Concepcion Romero   YOB: 1945   Date of Visit: 8/23/2018       Dear Dr. Dubon:    Thank you for referring Concepcion Romero to me for evaluation. Below are my notes for this consultation.    If you have questions, please do not hesitate to call me. I look forward to following your patient along with you.         Sincerely,        Mau Montoya MD        CC: MD Lindsay Crawley Lucas Z, MD  8/23/2018  2:56 PM  Signed  Concepcion Romero is a 73 y.o. female  8/23/2018  Trinity Dubon MD    Neurology Consult Note    Subjective     Concepcion Romero is a 73 y.o. female who is being evaluated for pain, numbness and weakness.  I previously saw the patient several months ago.  It was my impression she was suffering from a neuropathy.  I recommended she increase her gabapentin and attend physical therapy.    Since her last visit, overall the patient has been doing better.  She has been attending physical therapy twice a week.  She reported that her legs feel stronger.  She thinks that her balance has improved about 50%.  She continues to at times walk with a cane although other times she walks completely independently.  She has not stumbled, fallen or injured herself.  She has chronic neck and back pain.    The patient reported that aside from arthritis and orthopedic issues, her arms and hands feel fine.  She denied focal weakness in her legs.  Unfortunately she continues to experience neuropathic pain.  She has a combination of numbness, paresthesias and dysesthesias.  Her symptoms tend to be worse at night when she is no longer walking and either sitting or laying in bed.  She increased her gabapentin to 600 mg at night and was unable to add another pill due to extreme fatigue and malaise.  She reported that she was sleeping all day and that she needs to take care of her activities of daily  living.  She has had no bowel or bladder dysfunction or autonomic symptoms.    Detailed neurologic and medical review of systems is otherwise unremarkable.  There were no symptoms to suggest increased intracranial pressure, meningitis or systemic illness.    Review of Systems  Constitutional: negative  Eyes: negative  Ears, nose, mouth, throat, and face: negative  Respiratory: negative  Cardiovascular: negative  Gastrointestinal: negative  Genitourinary:negative  Integument/breast: negative  Hematologic/lymphatic: negative  Musculoskeletal:negative  Neurological: negative  Behavioral/Psych: negative  Endocrine: negative  Allergic/Immunologic: negative    Allergy:  Allergies   Allergen Reactions   • Ciprofloxacin Itching       Current Outpatient Prescriptions   Medication Sig Dispense Refill   • gabapentin (NEURONTIN) 300 mg capsule Take 600 mg by mouth 2 (two) times a day. Take 1 capsule in the morning and 2 capsules at night      • aspirin 81 mg enteric coated tablet Take 81 mg by mouth daily.     • B-complex with vitamin C tablet Take 1 tablet by mouth daily.     • bisoprolol (ZEBETA) 5 mg tablet Take 1 tablet (5 mg total) by mouth daily. 90 tablet 2   • cholecalciferol, vitamin D3, 1,000 unit tablet Take 1,000 Units by mouth daily.     • multivit-min-FA-lycopen-lutein tablet Take 1 tablet by mouth daily.     • simvastatin (ZOCOR) 20 mg tablet Take 1 tablet (20 mg total) by mouth daily. 90 tablet 2   • triamterene-hydrochlorothiazide (MAXZIDE-25) 37.5-25 mg per tablet Take 1 tablet by mouth daily. 90 tablet 2   • zolpidem (AMBIEN) 5 mg tablet Take 1 tablet (5 mg total) by mouth nightly as needed for sleep. 30 tablet 2     No current facility-administered medications for this visit.        Past Medical History:  Past Medical History:   Diagnosis Date   • Basal cell carcinoma     On nose   • Colon cancer (CMS/HCC) (HCC)     Diagnosed at age 72   • HBP (high blood pressure)    • High cholesterol    • Melanoma  (CMS/HCC) (HCC)     ON back   • Neuropathy (CMS/HCC)    • Screening for breast cancer     Annual mammograms       Past Surgical History:  Past Surgical History:   Procedure Laterality Date   • BOWEL RESECTION     • COLONOSCOPY      2 total colonoscopy    • MOHS SURGERY  03/2018   • TUBAL LIGATION         Social History:  Social History     Social History   • Marital status:      Spouse name: N/A   • Number of children: N/A   • Years of education: N/A     Social History Main Topics   • Smoking status: Former Smoker     Packs/day: 1.00   • Smokeless tobacco: Never Used      Comment: from age [age]   • Alcohol use Yes      Comment: 7-14 glasses of wine per day   • Drug use: No   • Sexual activity: Not Asked     Other Topics Concern   • None     Social History Narrative   • None       Family History:  Family History   Problem Relation Age of Onset   • Heart disease Mother    • Heart disease Father    • Heart disease Brother        Objective     Physical Exam  /78   Pulse 66   Resp 16     General Appearance:  Alert, no distress, appears stated age                                                   Neurologic Exam:  Alert and oriented. Attention, concentration, memory, language, visual spatial orientation, executive function is normal.    Pupils equal round and reactive to light. Extraocular movement full with normal pursuit + saccades. No nystagmus noted.   Facial strength and sensation is normal. Hearing normal.  The tongue and uvula were midline. No dysarthria or dysphagia.   Strength was 5/5 in bulbar, axial + extremity muscles.There was normal bulk and tone with no abnormal movements.   Small fiber sensory modalities were reduced in the feet.  There was no dysmetria or cerebellar signs.   The gait was narrow based. Patient was able to tandem walk. Negative Romberg sign. Reflexes were + in the arms and absent in the legs. Hill sign was negative. Plantar responses were flexor.         Problem List  Items Addressed This Visit     Pain - Primary    Current Assessment & Plan     The patient unfortunately continues to experience neuropathic pain.  Gabapentin has been ineffective and she is unable to tolerate higher doses.  She will discuss with her insurance what medications will be covered such as Lyrica, Cymbalta, nortriptyline etc.         Numbness    Current Assessment & Plan     The patient has had a comprehensive workup which was unrevealing.  She is suffering from a small fiber polyneuropathy as well as superimposed radiculopathy.         Weakness    Current Assessment & Plan     The patient has experienced weakness and imbalance.  Fortunately with physical therapy she is feeling much stronger and her balance has improved.  I recommended she continue to remain as aggressive as possible regarding therapy and physical activity.                 It was a real pleasure meeting Concepcion Romero today, thank you for allowing me to participate in the medical care. If you have any questions, please call me at any time. Concepcion Romero will follow up with me in the coming weeks to months and keep me updated by telephone. Concepcion Romero knows to notify me immediately if there is any change in the condition or if there are any new symptoms of transient or static neurologic dysfunction.    Mau Montoya MD

## 2018-08-23 NOTE — ASSESSMENT & PLAN NOTE
The patient has had a comprehensive workup which was unrevealing.  She is suffering from a small fiber polyneuropathy as well as superimposed radiculopathy.

## 2018-08-23 NOTE — PROGRESS NOTES
Concepcion Romero is a 73 y.o. female  8/23/2018  Trinity Dubon MD    Neurology Consult Note    Subjective     Concepcion Romero is a 73 y.o. female who is being evaluated for pain, numbness and weakness.  I previously saw the patient several months ago.  It was my impression she was suffering from a neuropathy.  I recommended she increase her gabapentin and attend physical therapy.    Since her last visit, overall the patient has been doing better.  She has been attending physical therapy twice a week.  She reported that her legs feel stronger.  She thinks that her balance has improved about 50%.  She continues to at times walk with a cane although other times she walks completely independently.  She has not stumbled, fallen or injured herself.  She has chronic neck and back pain.    The patient reported that aside from arthritis and orthopedic issues, her arms and hands feel fine.  She denied focal weakness in her legs.  Unfortunately she continues to experience neuropathic pain.  She has a combination of numbness, paresthesias and dysesthesias.  Her symptoms tend to be worse at night when she is no longer walking and either sitting or laying in bed.  She increased her gabapentin to 600 mg at night and was unable to add another pill due to extreme fatigue and malaise.  She reported that she was sleeping all day and that she needs to take care of her activities of daily living.  She has had no bowel or bladder dysfunction or autonomic symptoms.    Detailed neurologic and medical review of systems is otherwise unremarkable.  There were no symptoms to suggest increased intracranial pressure, meningitis or systemic illness.    Review of Systems  Constitutional: negative  Eyes: negative  Ears, nose, mouth, throat, and face: negative  Respiratory: negative  Cardiovascular: negative  Gastrointestinal: negative  Genitourinary:negative  Integument/breast: negative  Hematologic/lymphatic:  negative  Musculoskeletal:negative  Neurological: negative  Behavioral/Psych: negative  Endocrine: negative  Allergic/Immunologic: negative    Allergy:  Allergies   Allergen Reactions   • Ciprofloxacin Itching       Current Outpatient Prescriptions   Medication Sig Dispense Refill   • gabapentin (NEURONTIN) 300 mg capsule Take 600 mg by mouth 2 (two) times a day. Take 1 capsule in the morning and 2 capsules at night      • aspirin 81 mg enteric coated tablet Take 81 mg by mouth daily.     • B-complex with vitamin C tablet Take 1 tablet by mouth daily.     • bisoprolol (ZEBETA) 5 mg tablet Take 1 tablet (5 mg total) by mouth daily. 90 tablet 2   • cholecalciferol, vitamin D3, 1,000 unit tablet Take 1,000 Units by mouth daily.     • multivit-min-FA-lycopen-lutein tablet Take 1 tablet by mouth daily.     • simvastatin (ZOCOR) 20 mg tablet Take 1 tablet (20 mg total) by mouth daily. 90 tablet 2   • triamterene-hydrochlorothiazide (MAXZIDE-25) 37.5-25 mg per tablet Take 1 tablet by mouth daily. 90 tablet 2   • zolpidem (AMBIEN) 5 mg tablet Take 1 tablet (5 mg total) by mouth nightly as needed for sleep. 30 tablet 2     No current facility-administered medications for this visit.        Past Medical History:  Past Medical History:   Diagnosis Date   • Basal cell carcinoma     On nose   • Colon cancer (CMS/HCC) (HCC)     Diagnosed at age 72   • HBP (high blood pressure)    • High cholesterol    • Melanoma (CMS/HCC) (HCC)     ON back   • Neuropathy (CMS/HCC)    • Screening for breast cancer     Annual mammograms       Past Surgical History:  Past Surgical History:   Procedure Laterality Date   • BOWEL RESECTION     • COLONOSCOPY      2 total colonoscopy    • MOHS SURGERY  03/2018   • TUBAL LIGATION         Social History:  Social History     Social History   • Marital status:      Spouse name: N/A   • Number of children: N/A   • Years of education: N/A     Social History Main Topics   • Smoking status: Former Smoker      Packs/day: 1.00   • Smokeless tobacco: Never Used      Comment: from age [age]   • Alcohol use Yes      Comment: 7-14 glasses of wine per day   • Drug use: No   • Sexual activity: Not Asked     Other Topics Concern   • None     Social History Narrative   • None       Family History:  Family History   Problem Relation Age of Onset   • Heart disease Mother    • Heart disease Father    • Heart disease Brother        Objective     Physical Exam  /78   Pulse 66   Resp 16     General Appearance:  Alert, no distress, appears stated age                                                   Neurologic Exam:  Alert and oriented. Attention, concentration, memory, language, visual spatial orientation, executive function is normal.    Pupils equal round and reactive to light. Extraocular movement full with normal pursuit + saccades. No nystagmus noted.   Facial strength and sensation is normal. Hearing normal.  The tongue and uvula were midline. No dysarthria or dysphagia.   Strength was 5/5 in bulbar, axial + extremity muscles.There was normal bulk and tone with no abnormal movements.   Small fiber sensory modalities were reduced in the feet.  There was no dysmetria or cerebellar signs.   The gait was narrow based. Patient was able to tandem walk. Negative Romberg sign. Reflexes were + in the arms and absent in the legs. Hill sign was negative. Plantar responses were flexor.         Problem List Items Addressed This Visit     Pain - Primary    Current Assessment & Plan     The patient unfortunately continues to experience neuropathic pain.  Gabapentin has been ineffective and she is unable to tolerate higher doses.  She will discuss with her insurance what medications will be covered such as Lyrica, Cymbalta, nortriptyline etc.         Numbness    Current Assessment & Plan     The patient has had a comprehensive workup which was unrevealing.  She is suffering from a small fiber polyneuropathy as well as superimposed  radiculopathy.         Weakness    Current Assessment & Plan     The patient has experienced weakness and imbalance.  Fortunately with physical therapy she is feeling much stronger and her balance has improved.  I recommended she continue to remain as aggressive as possible regarding therapy and physical activity.                 It was a real pleasure meeting Concepcion Romero today, thank you for allowing me to participate in the medical care. If you have any questions, please call me at any time. Concepcion Romero will follow up with me in the coming weeks to months and keep me updated by telephone. Concepcion Romero knows to notify me immediately if there is any change in the condition or if there are any new symptoms of transient or static neurologic dysfunction.    Mau Montoya MD

## 2018-08-23 NOTE — ASSESSMENT & PLAN NOTE
The patient unfortunately continues to experience neuropathic pain.  Gabapentin has been ineffective and she is unable to tolerate higher doses.  She will discuss with her insurance what medications will be covered such as Lyrica, Cymbalta, nortriptyline etc.

## 2018-08-23 NOTE — ASSESSMENT & PLAN NOTE
The patient has experienced weakness and imbalance.  Fortunately with physical therapy she is feeling much stronger and her balance has improved.  I recommended she continue to remain as aggressive as possible regarding therapy and physical activity.

## 2018-09-26 ENCOUNTER — OFFICE VISIT (OUTPATIENT)
Dept: PRIMARY CARE | Facility: CLINIC | Age: 73
End: 2018-09-26
Payer: MEDICARE

## 2018-09-26 VITALS
DIASTOLIC BLOOD PRESSURE: 82 MMHG | SYSTOLIC BLOOD PRESSURE: 110 MMHG | TEMPERATURE: 97.7 F | RESPIRATION RATE: 16 BRPM | OXYGEN SATURATION: 96 % | BODY MASS INDEX: 35.97 KG/M2 | HEART RATE: 71 BPM | WEIGHT: 203 LBS | HEIGHT: 63 IN

## 2018-09-26 DIAGNOSIS — Z78.0 POST-MENOPAUSE: ICD-10-CM

## 2018-09-26 DIAGNOSIS — Z23 FLU VACCINE NEED: Primary | ICD-10-CM

## 2018-09-26 DIAGNOSIS — Z00.00 MEDICARE ANNUAL WELLNESS VISIT, SUBSEQUENT: ICD-10-CM

## 2018-09-26 PROCEDURE — G0439 PPPS, SUBSEQ VISIT: HCPCS | Performed by: FAMILY MEDICINE

## 2018-09-26 PROCEDURE — 90653 IIV ADJUVANT VACCINE IM: CPT | Performed by: FAMILY MEDICINE

## 2018-09-26 PROCEDURE — G0008 ADMIN INFLUENZA VIRUS VAC: HCPCS | Performed by: FAMILY MEDICINE

## 2018-09-26 ASSESSMENT — MINI COG
COMPLETED: YES
TOTAL SCORE: 2

## 2018-09-26 ASSESSMENT — VISUAL ACUITY
OD_CC: 20/30
OS_CC: 20/25

## 2018-09-26 NOTE — PROGRESS NOTES
"Subjective      Patient ID: Concepcion Romero is a 73 y.o. female.  1945      HPI  For sub AWV and flu shot  The following have been reviewed and updated as appropriate in this visit:  Tobacco  Allergies  Meds  Problems  Med Hx  Surg Hx  Fam Hx  Soc Hx        Review of Systems    Objective     Vitals:    09/26/18 1033   BP: 110/82   BP Location: Left upper arm   Pulse: 71   Resp: 16   Temp: 36.5 °C (97.7 °F)   TempSrc: Oral   SpO2: 96%   Weight: 92.1 kg (203 lb)   Height: 1.588 m (5' 2.5\")     Body mass index is 36.54 kg/m².    Physical Exam    Assessment/Plan   Problem List Items Addressed This Visit     None      Visit Diagnoses     Medicare annual wellness visit, subsequent    -  Primary    PPPS   fluad today  f/u padmini fast labs next    Post-menopause        Relevant Orders    DEXA BONE DENSITY    Flu vaccine need                Subjective     Concepcion Romero is a 73 y.o. female who presents for a subsequent annual wellness visit.     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)   • Pain   • Numbness   • Weakness   • Ataxia   • Idiopathic peripheral neuropathy   • Chronic coronary artery disease   • Cardiomyopathy (CMS/HCC) (HCC)   • HTN (hypertension)   • Hyperlipidemia   • Seasonal allergies   • Colon cancer (CMS/HCC) (HCC)     Past Medical History:   Diagnosis Date   • Basal cell carcinoma     On nose   • Colon cancer (CMS/HCC) (HCC)     Diagnosed at age 72   • HBP (high blood pressure)    • High cholesterol    • Melanoma (CMS/HCC) (HCC)     ON back   • Neuropathy (CMS/HCC)    • Screening for breast cancer     Annual mammograms     Past Surgical History:   Procedure Laterality Date   • BOWEL RESECTION     • COLONOSCOPY      2 total colonoscopy    • MOHS SURGERY  03/2018   • TUBAL LIGATION       Allergies   Allergen Reactions   • Ciprofloxacin Itching     Current Outpatient Prescriptions   Medication Sig " "Dispense Refill   • B-complex with vitamin C tablet Take 1 tablet by mouth daily.     • bisoprolol (ZEBETA) 5 mg tablet Take 1 tablet (5 mg total) by mouth daily. 90 tablet 2   • cholecalciferol, vitamin D3, 1,000 unit tablet Take 1,000 Units by mouth daily.     • gabapentin (NEURONTIN) 300 mg capsule Take 600 mg by mouth nightly.       • multivit-min-FA-lycopen-lutein tablet Take 1 tablet by mouth daily.     • simvastatin (ZOCOR) 20 mg tablet Take 1 tablet (20 mg total) by mouth daily. 90 tablet 2   • triamterene-hydrochlorothiazide (MAXZIDE-25) 37.5-25 mg per tablet Take 1 tablet by mouth daily. 90 tablet 2   • zolpidem (AMBIEN) 5 mg tablet Take 1 tablet (5 mg total) by mouth nightly as needed for sleep. 30 tablet 2     No current facility-administered medications for this visit.      Social History     Social History   • Marital status:      Spouse name: N/A   • Number of children: N/A   • Years of education: N/A     Social History Main Topics   • Smoking status: Former Smoker     Packs/day: 1.00   • Smokeless tobacco: Never Used      Comment: from age [age]   • Alcohol use Yes      Comment: 7-14 glasses of wine per day   • Drug use: No   • Sexual activity: Not Asked     Other Topics Concern   • None     Social History Narrative   • None       Objective   Vitals  Vitals:    09/26/18 1033   BP: 110/82   BP Location: Left upper arm   Pulse: 71   Resp: 16   Temp: 36.5 °C (97.7 °F)   TempSrc: Oral   SpO2: 96%   Weight: 92.1 kg (203 lb)   Height: 1.588 m (5' 2.5\")     Body mass index is 36.54 kg/m².    Advanced Care Plan  Does patient have advance directive?: Yes                                     PHQ  Over the Past 2 Weeks, Have You Felt Down, Depressed or Hopeless?: no  Over the Past 2 Weeks, Have You Felt Little Interest or Pleasure In Doing Things?: no                                          Mini Cog  Completed: Yes  Score: 2  Result: Positive    Get Up and Go  Result: Pass            STEADI Falls " Risk  One or more falls in the last year: No           Has trouble stepping up onto a curb: Yes   Advised to use a cane or walker to get around safely: Yes   Often has to rush to the toilet: No   Feels unsteady when walking: Yes   Has lost some feeling in feet: Yes   Often feels sad or depressed: No   Steadies self on furniture while walking at home: Yes   Takes medication that makes him/her feel lightheaded or more tired than usual: No   Worried about falling: Yes   Takes medicine to sleep or improve mood: Yes   Needs to push with hands when rising from a chair: Yes   Falls screen completed: Yes       Hearing and Vision Screening   Visual Acuity Screening    Right eye Left eye Both eyes   Without correction:      With correction: 20/30 20/25 20/20   Hearing Screening Comments: Hearing Screen Adequate      Assessment/Plan   Problem List Items Addressed This Visit     None      Visit Diagnoses     Medicare annual wellness visit, subsequent    -  Primary    PPPS   fluad today  f/u padmini fast labs next    Post-menopause        Relevant Orders    DEXA BONE DENSITY    Flu vaccine need              See Patient Instructions (the written plan) which was given to the patient for PPPS and health risk factors with interventions.

## 2018-09-26 NOTE — PATIENT INSTRUCTIONS
Women's Personalized Prevention Plan Services (PPPS)      Preventive Services Checklist (Assumes Average Risk Unless Otherwise Noted)  Preventive Service Target Population and Frequency Last Done Date Due   Abd Aortic Aneurysm Screening Family history of AAA (covered once)     Alcohol Misuse Screening As necessary for those at risk (covered annually)     Breast Cancer Screening Mammogram every 1-2yrs 50-71yo; every 1-2yrs 35-48yo if patient desires after weighing potential harms and benefits (covered once 35-38yo, annually >=41yo)     Cholesterol Screening Both risk factors: 1) >=21yo and 2)  increased risk coronary artery disease (covered every 5 years)     Colorectal Cancer Screening >=49yo: Colonoscopy every 10 years, FIT annually or Cologuard every 3 years (up to 84yo for Cologuard)     Diabetes Screening Any 1 risk factor: hypertension, dyslipidemia, obesity, high glucose; or Any 2 risk factors: >=64 yo, overweight, family history diabetes, history gestational diabetes or delivery of infant >9lbs (covered every 6 months)     Glaucoma Screening Any 1 risk factor: 1) diabetes, 2) family history glaucoma, 3)  >=49yo, 4)  American >=64yo (covered annually)     Hepatitis C Screening Any 1 risk factor: 1) born between 0121-4307, 2) blood transfusion before 1992, 3) current or past injection drug use (covered once for average risk, annually for high risk)     Lung Cancer Screening Low-dose chest CT if all 3 risk factors: 1) 55-78yo, 2) smoker or quit within last 15y, 3) >=30 pack years (covered annually)     Osteoporosis Screening >=64yo (covered every 24 months)  <64yo if any 1 risk factor: 1) estrogen deficient and at risk for osteoporosis; 2) established osteoporosis on treatment; 3) x-rays show possible osteoporosis, osteopenia or vertebral fractures; 4) on steroid or planning to start; 5) primary hyperparathyroidism (covered as medically necessary)     Sexually  "Transmitted Diseases (STDs) As necessary chlamydia, gonorrhea, syphilis, hepatitis B (covered annually if not pregnant, more often in pregnancy)  HIV if any 1 risk factor present: 1) <16yo or >66yo and at increased risk, 2) 15-66yo and ask for it, or 3) pregnant (covered annually if not pregnant, up to 3x in pregnancy)     Vaccine: Hepatitis B As necessary if medium or high risk (series covered once)     Vaccine: Influenza All patients without contraindications (covered annually.)     Vaccine: Pneumococcal Pneumovax + Prevnar (both covered, >=11 months apart)     Vaccine: Zoster (Shingles) >= 59yo without contraindications (covered once by Part D)     Other Services               Women's Personalized Prevention Plan Services (PPPS)              Prints to AVS                                         Health Risk Factors and Interventions  \"x\" Indicates risk is associated with this patient Risk Factor Recommended Interventions     Obesity     Hypertension     Diabetes     Fall Risk     Tobacco Use     Other:          "

## 2018-10-15 ENCOUNTER — OFFICE VISIT (OUTPATIENT)
Dept: SURGERY | Facility: CLINIC | Age: 73
End: 2018-10-15
Payer: MEDICARE

## 2018-10-15 ENCOUNTER — LAB REQUISITION (OUTPATIENT)
Dept: LAB | Facility: HOSPITAL | Age: 73
End: 2018-10-15
Attending: INTERNAL MEDICINE
Payer: MEDICARE

## 2018-10-15 VITALS
HEIGHT: 63 IN | HEART RATE: 72 BPM | TEMPERATURE: 97.5 F | SYSTOLIC BLOOD PRESSURE: 134 MMHG | BODY MASS INDEX: 35.97 KG/M2 | WEIGHT: 203 LBS | DIASTOLIC BLOOD PRESSURE: 81 MMHG

## 2018-10-15 DIAGNOSIS — D50.9 IRON DEFICIENCY ANEMIA: ICD-10-CM

## 2018-10-15 DIAGNOSIS — C18.0 MALIGNANT NEOPLASM OF CECUM (CMS/HCC): ICD-10-CM

## 2018-10-15 DIAGNOSIS — C18.3 MALIGNANT NEOPLASM OF HEPATIC FLEXURE (CMS/HCC): Primary | ICD-10-CM

## 2018-10-15 LAB
ALBUMIN SERPL-MCNC: 3.8 G/DL (ref 3.4–5)
ALP SERPL-CCNC: 63 IU/L (ref 35–126)
ALT SERPL-CCNC: 28 IU/L (ref 11–54)
ANION GAP SERPL CALC-SCNC: 10 MEQ/L (ref 3–15)
AST SERPL-CCNC: 30 IU/L (ref 15–41)
BASOPHILS # BLD: 0.01 K/UL (ref 0.01–0.1)
BASOPHILS NFR BLD: 0.2 %
BILIRUB SERPL-MCNC: 0.4 MG/DL (ref 0.3–1.2)
BUN SERPL-MCNC: 8 MG/DL (ref 8–20)
CALCIUM SERPL-MCNC: 9.8 MG/DL (ref 8.9–10.3)
CEA SERPL-MCNC: 1 NG/ML
CHLORIDE SERPL-SCNC: 97 MEQ/L (ref 98–109)
CO2 SERPL-SCNC: 30 MEQ/L (ref 22–32)
CREAT SERPL-MCNC: 0.8 MG/DL (ref 0.6–1.1)
DIFFERENTIAL METHOD BLD: NORMAL
EOSINOPHIL # BLD: 0.08 K/UL (ref 0.04–0.36)
EOSINOPHIL NFR BLD: 1.6 %
ERYTHROCYTE [DISTWIDTH] IN BLOOD BY AUTOMATED COUNT: 11.9 % (ref 11.7–14.4)
GFR SERPL CREATININE-BSD FRML MDRD: >60 ML/MIN/1.73M*2
GLUCOSE SERPL-MCNC: 112 MG/DL (ref 70–99)
HCT VFR BLDCO AUTO: 40.9 % (ref 35–45)
HGB BLD-MCNC: 14.3 G/DL (ref 11.8–15.7)
IMM GRANULOCYTES # BLD AUTO: 0.01 K/UL (ref 0–0.08)
IMM GRANULOCYTES NFR BLD AUTO: 0.2 %
LYMPHOCYTES # BLD: 1.26 K/UL (ref 1.2–3.5)
LYMPHOCYTES NFR BLD: 25.8 %
MCH RBC QN AUTO: 31.4 PG (ref 28–33.2)
MCHC RBC AUTO-ENTMCNC: 35 G/DL (ref 32.2–35.5)
MCV RBC AUTO: 89.7 FL (ref 83–98)
MONOCYTES # BLD: 0.44 K/UL (ref 0.28–0.8)
MONOCYTES NFR BLD: 9 %
NEUTROPHILS # BLD: 3.08 K/UL (ref 1.7–7)
NEUTS SEG NFR BLD: 63.2 %
NRBC BLD-RTO: 0 %
PDW BLD AUTO: 8.6 FL (ref 9.4–12.3)
PLATELET # BLD AUTO: 174 K/UL (ref 150–369)
POTASSIUM SERPL-SCNC: 3.6 MEQ/L (ref 3.6–5.1)
PROT SERPL-MCNC: 5.9 G/DL (ref 6–8.2)
RBC # BLD AUTO: 4.56 M/UL (ref 3.93–5.22)
SODIUM SERPL-SCNC: 137 MEQ/L (ref 136–144)
WBC # BLD AUTO: 4.88 K/UL (ref 3.8–10.5)

## 2018-10-15 PROCEDURE — 85025 COMPLETE CBC W/AUTO DIFF WBC: CPT | Performed by: INTERNAL MEDICINE

## 2018-10-15 PROCEDURE — 82378 CARCINOEMBRYONIC ANTIGEN: CPT | Performed by: INTERNAL MEDICINE

## 2018-10-15 PROCEDURE — 80053 COMPREHEN METABOLIC PANEL: CPT | Performed by: INTERNAL MEDICINE

## 2018-10-15 PROCEDURE — 36415 COLL VENOUS BLD VENIPUNCTURE: CPT | Performed by: INTERNAL MEDICINE

## 2018-10-15 PROCEDURE — 99214 OFFICE O/P EST MOD 30 MIN: CPT | Performed by: COLON & RECTAL SURGERY

## 2018-10-15 RX ORDER — BISACODYL 5 MG
20 TABLET, DELAYED RELEASE (ENTERIC COATED) ORAL ONCE
Qty: 4 TABLET | Refills: 0 | Status: SHIPPED | OUTPATIENT
Start: 2018-10-15 | End: 2019-03-29

## 2018-10-15 RX ORDER — POLYETHYLENE GLYCOL 3350 17 G/17G
238 POWDER, FOR SOLUTION ORAL ONCE
Qty: 238 G | Refills: 0 | Status: SHIPPED | OUTPATIENT
Start: 2018-10-15 | End: 2019-03-29

## 2018-10-15 RX ORDER — SODIUM CHLORIDE 9 MG/ML
INJECTION, SOLUTION INTRAVENOUS CONTINUOUS
Status: CANCELLED | OUTPATIENT
Start: 2018-10-15 | End: 2018-10-16

## 2018-10-15 NOTE — LETTER
"  Dear Trinity,    I saw Concepcion Romero in the office today in follow-up. Please see my note below.    If you have any additional questions, please do not hesitate to call me.    Sincerely,    Hernando Xiong MD      _____________________________________      Colorectal Surgery Follow-up    Subjective     Concepcion Romero is a 73 y.o. 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  She is now 10 months postop.    She has no unexplained weight loss.  Her energy level has been normal.  She is having excellent bowel function with bowel movements once a day or every other day.    She is due to see Dr. Kinsey today and will be getting a follow-up CEA.        Medical History:   Past Medical History:   Diagnosis Date   • Basal cell carcinoma     On nose   • Colon cancer (CMS/HCC) (HCC)     Diagnosed at age 72   • HBP (high blood pressure)    • High cholesterol    • Melanoma (CMS/HCC) (HCC)     ON back   • Neuropathy    • Screening for breast cancer     Annual mammograms       Surgical History:   Past Surgical History:   Procedure Laterality Date   • BOWEL RESECTION     • COLONOSCOPY      2 total colonoscopy    • MOHS SURGERY  03/2018   • TUBAL LIGATION         Allergies: Ciprofloxacin    Current Medications:  •  B-complex with vitamin C  •  bisoprolol  •  cholecalciferol (vitamin D3)  •  gabapentin  •  multivit-min-FA-lycopen-lutein  •  simvastatin  •  triamterene-hydrochlorothiazide  •  zolpidem  •  bisacodyl  •  polyethylene glycol    Objective     Physicial Exam  /81 (BP Location: Right forearm, Patient Position: Sitting)   Pulse 72   Temp 36.4 °C (97.5 °F) (Temporal)   Ht 1.588 m (5' 2.5\")   Wt 92.1 kg (203 lb)   BMI 36.54 kg/m²      Physical Exam   Constitutional: She is oriented to person, place, and time. She appears well-developed and well-nourished.   HENT:   Head: Normocephalic and atraumatic.   Neck: Normal range of motion. Neck supple. "   Cardiovascular: Normal rate, regular rhythm and normal heart sounds.    Pulmonary/Chest: Breath sounds normal. She has no wheezes.   Abdominal: Soft. She exhibits no distension and no mass. There is no hepatomegaly. There is no tenderness.   Robotic port sites have healed beautifully.  The specimen extraction site also looks good.  There are no nodules at the incisions.   Musculoskeletal: Normal range of motion.   Lymphadenopathy:     She has no cervical adenopathy.        Right: No inguinal adenopathy present.        Left: No inguinal adenopathy present.   Neurological: She is alert and oriented to person, place, and time.   Skin: Skin is warm and dry. No rash noted.   Psychiatric: She has a normal mood and affect.   Vitals reviewed.      CEA   Date Value Ref Range Status   07/16/2018 1.1 ng/mL Final     Comment:     ADULT REFERENCE RANGE  Non-smokers: <3.0 ng/mL  Smokers: <5.0 ng/mL  Serum CEA levels, regardless of value, should not be interpreted as absolute evidence of the presence or absence of disease. The CEA value should be used in conjunction with information available from the clinical evaluation and other diagnostic procedures.   02/07/2018 6.3 NG/ML Final     Comment:     ADULT REFERENCE RANGE:  Non-smokers: < 3.0 ng/mL  Smokers: < 5.0 ng/mL  Serum CEA levels, regardless of value, should not be interpreted as absolute evidence of the presence or absence of disease.  The CEA value should be used in conjunction with information available from the clinical evaluation and other diagnostic procedures. CEA assays should not be used as a cancer screening test.       Cea   Date Value Ref Range Status   05/03/2018 0.5 See Note: ng/mL Final     Comment:     Reference Range:  Non-Smoker: <2.5  Smoker:     <5.0        This test was performed using the Siemens   chemiluminescent method. Values obtained from  different assay methods cannot be used  interchangeably. CEA levels, regardless of  value, should not be  interpreted as absolute  evidence of the presence or absence of disease.        ]    Assessment     Problem List Items Addressed This Visit     Malignant neoplasm of hepatic flexure (CMS/HCC) - Primary    Overview     FIT+, Anemia  11/14/17 -colonoscopy (me) hepatic flexure mass.  s/p Robotic RHC 1/10/18 for bulky Hepatic flexure tumor  Stage II. No adjuvant Therapy.  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 Negative genetic testing.         Current Assessment & Plan     Concepcion does not have any clinical sign of cancer recurrence.  We will follow-up on her CEA.  She will be due for colonoscopy in January, so we will get that scheduled.  We discussed the reason for colonoscopy and high-risk cancer screening.  We discussed the procedure and the risks and benefits of colonoscopy including the risk of perforation and bleeding from a polyp site.  We discussed bowel prep instructions.  she understands these issues and consents.           Relevant Orders    Case request operating room: FLEXIBLE COLONOSCOPY FOR COLORECTAL CANCER SCREENING HIGH RISK (Completed)                Jae Xiong MD

## 2018-10-15 NOTE — PROGRESS NOTES
"Colorectal Surgery Follow-up    Subjective     Concepcion Romero is a 73 y.o. 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  She is now 10 months postop.    She has no unexplained weight loss.  Her energy level has been normal.  She is having excellent bowel function with bowel movements once a day or every other day.    She is due to see Dr. Kinsey today and will be getting a follow-up CEA.        Medical History:   Past Medical History:   Diagnosis Date   • Basal cell carcinoma     On nose   • Colon cancer (CMS/HCC) (HCC)     Diagnosed at age 72   • HBP (high blood pressure)    • High cholesterol    • Melanoma (CMS/HCC) (HCC)     ON back   • Neuropathy    • Screening for breast cancer     Annual mammograms       Surgical History:   Past Surgical History:   Procedure Laterality Date   • BOWEL RESECTION     • COLONOSCOPY      2 total colonoscopy    • MOHS SURGERY  03/2018   • TUBAL LIGATION         Allergies: Ciprofloxacin    Current Medications:  •  B-complex with vitamin C  •  bisoprolol  •  cholecalciferol (vitamin D3)  •  gabapentin  •  multivit-min-FA-lycopen-lutein  •  simvastatin  •  triamterene-hydrochlorothiazide  •  zolpidem  •  bisacodyl  •  polyethylene glycol    Objective     Physicial Exam  /81 (BP Location: Right forearm, Patient Position: Sitting)   Pulse 72   Temp 36.4 °C (97.5 °F) (Temporal)   Ht 1.588 m (5' 2.5\")   Wt 92.1 kg (203 lb)   BMI 36.54 kg/m²     Physical Exam   Constitutional: She is oriented to person, place, and time. She appears well-developed and well-nourished.   HENT:   Head: Normocephalic and atraumatic.   Neck: Normal range of motion. Neck supple.   Cardiovascular: Normal rate, regular rhythm and normal heart sounds.    Pulmonary/Chest: Breath sounds normal. She has no wheezes.   Abdominal: Soft. She exhibits no distension and no mass. There is no hepatomegaly. There is no tenderness.   Robotic port sites have " healed beautifully.  The specimen extraction site also looks good.  There are no nodules at the incisions.   Musculoskeletal: Normal range of motion.   Lymphadenopathy:     She has no cervical adenopathy.        Right: No inguinal adenopathy present.        Left: No inguinal adenopathy present.   Neurological: She is alert and oriented to person, place, and time.   Skin: Skin is warm and dry. No rash noted.   Psychiatric: She has a normal mood and affect.   Vitals reviewed.      CEA   Date Value Ref Range Status   07/16/2018 1.1 ng/mL Final     Comment:     ADULT REFERENCE RANGE  Non-smokers: <3.0 ng/mL  Smokers: <5.0 ng/mL  Serum CEA levels, regardless of value, should not be interpreted as absolute evidence of the presence or absence of disease. The CEA value should be used in conjunction with information available from the clinical evaluation and other diagnostic procedures.   02/07/2018 6.3 NG/ML Final     Comment:     ADULT REFERENCE RANGE:  Non-smokers: < 3.0 ng/mL  Smokers: < 5.0 ng/mL  Serum CEA levels, regardless of value, should not be interpreted as absolute evidence of the presence or absence of disease.  The CEA value should be used in conjunction with information available from the clinical evaluation and other diagnostic procedures. CEA assays should not be used as a cancer screening test.       Cea   Date Value Ref Range Status   05/03/2018 0.5 See Note: ng/mL Final     Comment:     Reference Range:  Non-Smoker: <2.5  Smoker:     <5.0        This test was performed using the Siemens   chemiluminescent method. Values obtained from  different assay methods cannot be used  interchangeably. CEA levels, regardless of  value, should not be interpreted as absolute  evidence of the presence or absence of disease.        ]    Assessment     Problem List Items Addressed This Visit     Malignant neoplasm of hepatic flexure (CMS/HCC) - Primary    Overview     FIT+, Anemia  11/14/17 -colonoscopy (me) hepatic  flexure mass.  s/p Robotic RHC 1/10/18 for bulky Hepatic flexure tumor  Stage II. No adjuvant Therapy.  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 Negative genetic testing.         Current Assessment & Plan     Concepcion does not have any clinical sign of cancer recurrence.  We will follow-up on her CEA.  She will be due for colonoscopy in January, so we will get that scheduled.  We discussed the reason for colonoscopy and high-risk cancer screening.  We discussed the procedure and the risks and benefits of colonoscopy including the risk of perforation and bleeding from a polyp site.  We discussed bowel prep instructions.  she understands these issues and consents.           Relevant Orders    Case request operating room: FLEXIBLE COLONOSCOPY FOR COLORECTAL CANCER SCREENING HIGH RISK (Completed)                Jae Xiong MD

## 2018-10-15 NOTE — PATIENT INSTRUCTIONS
[facility]  [facility]    Miralax Prep  Colonoscopy Preparation Planner and Instructions      Your colonoscopy will be performed by  Jae Xiong Jr. M.D.     Location: [facility]. Report to 1st floor Admissions off the main lobby of the [facility].    You are scheduled for a colonoscopy, an examination of the colon (large intestine) with a lighted flexible scope.  During the colonoscopy, if an abnormality is seen, it is usually biopsied at that time.  A biopsy involves removing a portion or all of the abnormal area for processing and subsequent examination under a microscope.     Plan to be with us for a total of two to three hours.  When you arrive, you will need to complete your paperwork and then change into a patient gown.  The nursing staff will perform a brief assessment, place an I.V., and take you into the procedure room where you will be sedated and undergo the colonoscopy.  The colonoscopy itself takes about 15-30 minutes.     After the colonoscopy, you will rest in the recovery area while the sedative wears off.  Due to the sedation, you may not remember your conversation with the doctor after the colonoscopy.  Please have a family member or friend stay with you that can speak with the doctor and nurses after the procedure.  By law, you cannot drive the rest of the day of the colonoscopy.  We advise you to take the entire day off work.    A thorough cleansing of the colon is essential and the examination is most successful if you follow the directions for preparation completely, as outlined below.  If you have any questions about the test or preparation, please do not hesitate to call our office.    It is important for you to bring a list of all prescription medications and non-prescription products (over-the-counter, anti-inflammatory, herbal, vitamins, etc) you are taking and a list of any medications you are allergic to.      Purchase These Items Ahead of Time:  1. Four  Dulcolax tablets (does not need a prescription)  2. One 238-gram bottle of Miralax (over-the-counter, no prescription)  3. Two 64-ounce bottles of Gatorade (no red flavors)  4. Tuck’s pads or Vaseline can be used to protect the anal area especially if you have hemorrhoids.      Colonoscopy Preparation Timeline  Five or more days prior to colonoscopy:  - Arrange for a ride.  If you do not have a ride, we will have to cancel the procedure.    - Purchase the laxative medications listed above.     - Consider obtaining a protective ointment such as Desitin, or Vaseline to protect the anal area during the prep.  You can start to apply it after you take the first laxative.    - If you are taking coumadin (warfarin), or other blood thinners, contact us for specific instructions.  Aspirin is usually stopped one week before the colonoscopy.    - Make any needed arrangements to be off work or school on the day of the colonoscopy.  Please remember, by law, you cannot drive the rest of the day of the colonoscopy.    - Read and familiarize yourself with the preparation instructions below.    - Please call us with any questions.      Three days prior to your colonoscopy:  - Review and plan dietary needs for the next two days.    - Confirm your ride.    - If you have questions, please call us.    Two days prior to your colonoscopy:  - Eat well-balanced meals but try to avoid nuts, popcorn, raw fruit, raw vegetables, and salads.    - List any allergies and all prescription medications and non-prescription products (over-the-counter, anti-inflammatory, herbal, vitamins, etc.) you are taking.  Bring these lists with you on the day of the colonoscopy.      One day prior to your colonoscopy:  - Start on a Clear Liquid Diet when you get up and continue all day.       Clear Liquid Diet  Soups:  Clear bouillon, chicken broth, vegetable broth, beef broth, or consommé    Beverages:  Tea, coffee (without cream/milk), Candelario-Aid, carbonated  beverages, Gatorade  You may add sugar to coffee and tea but not milk or creamer (non-dairy creamers are okay).      Juices:  Cranberry, apple, grape, strained lemonade, limeade, and orange drink  Any juice that you can see through and has no pulp is acceptable.      Dessert:  Italian ices, popsicles, Jell-O, and hard candy.    - Do not drink red colored beverages or eat red Jell-O.      - In order to prevent hunger, some patients drink liquid Sustacal or Ensure, nutritional supplements available over-the-counter in pharmacies. You can drink up to 4 cans up until midnight.  (Sustacal and Ensure are not clear, but are completely absorbed in the small bowel, and are okay.)    Mix the 238-gram bottle of Miralax in 64-ounces of Gatorade.  Shake the solution until the Miralax is dissolved.  Put the solution aside and chill - it helps with the taste.    - No solid food of any kind.      - Throughout the day, make sure to drink at least eight glasses (two quarts) of fluids such as Gatorade or a similar product, preferably not only plain water.    - Take your usual prescription medications (except iron).  If you are on coumadin or other blood thinners, please contact our office and your primary physician for specific instructions at least one week prior to the colonoscopy.    - Diabetic Patients:  You may have your usual breakfast today and should take your diabetic medications.  Monitor your blood sugar at your usual times.     5 p.m.  Take four Dulcolax tablets by mouth with 32-ounces of Gatorade.    7 p.m.  Drink eight-ounces of the Miralax mixture every 10-15 minutes until the entire solution is gone    - Drink an additional 32-ounces of any clear liquid (without Miralax) over the next one to two hours.     Remember to remain close to toilet facilities!    - You may continue to have clear liquids until midnight.  After midnight, do not eat or drink anything except the colonoscopy prep and you should take your  medications with sips of water.      The day of your colonoscopy:  - Continue with all your usual prescription medications.  Please be sure to take any blood pressure or heart medications the morning of the test with a sip of water.      - Diabetic Patients:  Do not take your diabetes pills today, but bring a dose with you to take after your colonoscopy.  If you are on Insulin, take ½ of usual long-active insulin (such as NPH or lente) and no regular insulin.  Bring the remaining doses with you to take after your colonoscopy.    - When you are ready to leave, your designated  will take you home where you can eat and relax the rest of the day.  You will receive specific instructions about eating, activities, and medications before you leave.    Frequently Asked Questions  1. Is there any way that I can make this taste any better?    You can try sucking on hard candy or rinse your mouth with water or a mouthwash.  Do not eat or drink anything while you are drinking this solution.    2. Why avoid red liquids?    The red color can persist in the colon and potentially look like blood.    3. One of the medications I was instructed to take the morning of my procedure is red.  Can I take it?    Medications for blood pressure, heart conditions, and seizures should be taken the morning of your exam regardless of the color.    4. I feel like vomiting and do not think I can drink any more.  What should I do?    It is important that you continue to drink the solution if possible.  Without a clean bowel, the doctor will not be able to see the inside of your colon to complete the examination.  If you do vomit, wait 30 minutes and begin drinking the solution again.  If not improved, call us and have a phone number of an open pharmacy in case we need to call in a prescription.    5. I drank a lot of the solution and have not gone to the bathroom yet.  What should I do?    Keep drinking.  Most people have a bowel movement after  an hour; some patients may take two hours or longer.    6. I am taking the prep and now having loose, watery stools.  Do I still need the rest of the prep?    Yes, you may have solid stool higher in the colon that needs to be eliminated.    7. I already have diarrhea before taking the prep, do I still have to take the laxative?    Yes, you must take the prep as directed by your doctor.  Your colon is approximately six feet long.  The entire colon must be emptied for your physician to see the colon clearly.    8. I see yellow color in the toilet bowl and a few flecks.  What do I do?    If you drank the entire solution or if your last bowel movements were clear enough that you were able to see the bottom of the toilet, you should be fine.  It is okay if you have some flecks of material.  The yellow color is a result of bile that normally colors the feces.  This should not interfere with the examination.    9. My bottom is so sore.  What can I do?   To clean the area, avoid rubbing.  Gently pat with a wet washcloth.  Apply Vaseline, Balmex, or Desitin liberally.    10. Can I drink alcoholic beverages?    We strongly suggest that you do not drink any alcoholic beverages prior to your procedure since they can cause dehydration and some samantha may thin your blood.    11. Can I drink any nutritional supplements?    You may drink Ensure (chocolate or vanilla) or Slim-Fast with Soy Protein/Lactose Free.  These drinks are water based, not milk based.    12. Can I chew gum or suck candy?    Yes, but nothing with soft centers or red color.    13. What if I am still passing stool the morning of my test?    Take a tap water enema until you run clear.  If this does not work, call the office.    14. Can I brush my teeth?    Please do.    15. Can I wear my dentures?    Yes, you may wear your dentures to the endoscopy suite.  However, you may be asked to remove them prior to the procedure.    16. I have been instructed not to take  anti-inflammatories or blood thinners several days before the procedure.  What can I take for headaches and pain relief?    You may take Tylenol as directed.    17. Can I have chicken soup?    You can only have the broth; no noodles, chicken, or vegetables allowed.    18. Can I have the colonoscopy if I am on my menstrual period?    Yes, the procedure can still be performed.  We ask that you use a tampon if possible (not absolutely necessary).    19. Do I need a prescription for the laxatives?    It depends on the preparation instructions you were given.  Miralax no longer requires a prescription.    20. Are there risks to the procedure?  If a polyp is removed, there is a 1:50 risk of bleeding from the polypectomy site, so if you see blood in the stool or bloody diarrhea, you must call me.    The biggest risk of the procedure is colon perforation (making a hole in the colon). The risk of this about 1:1000 (but has never happed to me!) This usually requires surgery to fix the hole. In most cases, I can detect this complication at the time of the procedure, but if you develop increasing abdominal pain after you go home, please contact me.    21. How effective is the procedure?  Although colonoscopy is the best test to evaluate the colon, there is about a 5% chance I miss something. This can be due to a poor bowel prep, which makes these preparation instructions so important.  It is possible to miss a small polyp hiding behind a fold. Occasionally, I am unable to get the scope all the way to the end of the colon (the cecum) due to a twisty or redundant colon. If that happens, I send you for X-Ray studies after the procedure to examine what was missed.       If you have any questions, please feel free to call us at [phone]. My  is Nika Gasca and can be reached directly at [phone]

## 2018-10-15 NOTE — ASSESSMENT & PLAN NOTE
Concepcion does not have any clinical sign of cancer recurrence.  We will follow-up on her CEA.  She will be due for colonoscopy in January, so we will get that scheduled.  We discussed the reason for colonoscopy and high-risk cancer screening.  We discussed the procedure and the risks and benefits of colonoscopy including the risk of perforation and bleeding from a polyp site.  We discussed bowel prep instructions.  she understands these issues and consents.

## 2018-11-07 ENCOUNTER — TRANSCRIBE ORDERS (OUTPATIENT)
Dept: SCHEDULING | Age: 73
End: 2018-11-07

## 2018-11-07 DIAGNOSIS — C18.0 MALIGNANT NEOPLASM OF CECUM (CMS/HCC): Primary | ICD-10-CM

## 2018-11-08 ENCOUNTER — TELEPHONE (OUTPATIENT)
Dept: PRIMARY CARE | Facility: CLINIC | Age: 73
End: 2018-11-08

## 2018-11-08 NOTE — TELEPHONE ENCOUNTER
Patient calling carlos stating that  [facility] gave her prendisone in the past for the pains in her neck and she is starting to feel them again and she would like to know if you can give her a RX for this so she dosent have to go back to [facility] ??     Please advise

## 2018-11-08 NOTE — TELEPHONE ENCOUNTER
That is not a good idea.  Tell her to use Tylenol for pain and she can take an extra gabapentin for a few days.  If no better I will see her in the office next week.

## 2018-11-20 ENCOUNTER — HOSPITAL ENCOUNTER (OUTPATIENT)
Dept: RADIOLOGY | Age: 73
Discharge: HOME | End: 2018-11-20
Attending: INTERNAL MEDICINE
Payer: MEDICARE

## 2018-11-20 ENCOUNTER — TRANSCRIBE ORDERS (OUTPATIENT)
Dept: LAB | Age: 73
End: 2018-11-20

## 2018-11-20 ENCOUNTER — APPOINTMENT (OUTPATIENT)
Dept: LAB | Age: 73
End: 2018-11-20
Attending: INTERNAL MEDICINE
Payer: MEDICARE

## 2018-11-20 VITALS — HEIGHT: 64 IN | WEIGHT: 200 LBS | BODY MASS INDEX: 34.15 KG/M2

## 2018-11-20 DIAGNOSIS — C18.0 MALIGNANT NEOPLASM OF CECUM (CMS/HCC): ICD-10-CM

## 2018-11-20 DIAGNOSIS — C18.0 MALIGNANT NEOPLASM OF CECUM (CMS/HCC): Primary | ICD-10-CM

## 2018-11-20 DIAGNOSIS — D50.9 IRON DEFICIENCY ANEMIA: ICD-10-CM

## 2018-11-20 LAB
ALBUMIN SERPL-MCNC: 3.9 G/DL (ref 3.4–5)
ALP SERPL-CCNC: 63 IU/L (ref 35–126)
ALT SERPL-CCNC: 23 IU/L (ref 11–54)
ANION GAP SERPL CALC-SCNC: 9 MEQ/L (ref 3–15)
AST SERPL-CCNC: 30 IU/L (ref 15–41)
BASOPHILS # BLD: 0.03 K/UL (ref 0.01–0.1)
BASOPHILS NFR BLD: 0.6 %
BILIRUB SERPL-MCNC: 0.6 MG/DL (ref 0.3–1.2)
BUN SERPL-MCNC: 8 MG/DL (ref 8–20)
CALCIUM SERPL-MCNC: 9.9 MG/DL (ref 8.9–10.3)
CEA SERPL-MCNC: 1 NG/ML
CHLORIDE SERPL-SCNC: 94 MEQ/L (ref 98–109)
CO2 SERPL-SCNC: 28 MEQ/L (ref 22–32)
CREAT SERPL-MCNC: 0.9 MG/DL (ref 0.6–1.1)
DIFFERENTIAL METHOD BLD: ABNORMAL
EOSINOPHIL # BLD: 0.08 K/UL (ref 0.04–0.36)
EOSINOPHIL NFR BLD: 1.7 %
ERYTHROCYTE [DISTWIDTH] IN BLOOD BY AUTOMATED COUNT: 11.9 % (ref 11.7–14.4)
GFR SERPL CREATININE-BSD FRML MDRD: >60 ML/MIN/1.73M*2
GLUCOSE SERPL-MCNC: 90 MG/DL (ref 70–99)
HCT VFR BLDCO AUTO: 40.3 % (ref 35–45)
HGB BLD-MCNC: 14.1 G/DL (ref 11.8–15.7)
IMM GRANULOCYTES # BLD AUTO: 0.01 K/UL (ref 0–0.08)
IMM GRANULOCYTES NFR BLD AUTO: 0.2 %
LYMPHOCYTES # BLD: 0.95 K/UL (ref 1.2–3.5)
LYMPHOCYTES NFR BLD: 20.3 %
MCH RBC QN AUTO: 31.7 PG (ref 28–33.2)
MCHC RBC AUTO-ENTMCNC: 35 G/DL (ref 32.2–35.5)
MCV RBC AUTO: 90.6 FL (ref 83–98)
MONOCYTES # BLD: 0.47 K/UL (ref 0.28–0.8)
MONOCYTES NFR BLD: 10 %
NEUTROPHILS # BLD: 3.14 K/UL (ref 1.7–7)
NEUTS SEG NFR BLD: 67.2 %
NRBC BLD-RTO: 0 %
PDW BLD AUTO: 9.4 FL (ref 9.4–12.3)
PLATELET # BLD AUTO: 173 K/UL (ref 150–369)
POTASSIUM SERPL-SCNC: 3.8 MEQ/L (ref 3.6–5.1)
PROT SERPL-MCNC: 6.1 G/DL (ref 6–8.2)
RBC # BLD AUTO: 4.45 M/UL (ref 3.93–5.22)
SODIUM SERPL-SCNC: 131 MEQ/L (ref 136–144)
WBC # BLD AUTO: 4.68 K/UL (ref 3.8–10.5)

## 2018-11-20 PROCEDURE — 80053 COMPREHEN METABOLIC PANEL: CPT

## 2018-11-20 PROCEDURE — 85025 COMPLETE CBC W/AUTO DIFF WBC: CPT

## 2018-11-20 PROCEDURE — 63600105 HC IODINE BASED CONTRAST: Performed by: INTERNAL MEDICINE

## 2018-11-20 PROCEDURE — 74177 CT ABD & PELVIS W/CONTRAST: CPT

## 2018-11-20 PROCEDURE — 82378 CARCINOEMBRYONIC ANTIGEN: CPT

## 2018-11-20 PROCEDURE — 71260 CT THORAX DX C+: CPT

## 2018-11-20 PROCEDURE — 36415 COLL VENOUS BLD VENIPUNCTURE: CPT

## 2018-11-20 RX ADMIN — IOHEXOL 135 ML: 350 INJECTION, SOLUTION INTRAVENOUS at 10:13

## 2018-11-26 ENCOUNTER — HOSPITAL ENCOUNTER (OUTPATIENT)
Dept: RADIOLOGY | Age: 73
Discharge: HOME | End: 2018-11-26
Attending: FAMILY MEDICINE
Payer: MEDICARE

## 2018-11-26 DIAGNOSIS — Z78.0 POST-MENOPAUSE: ICD-10-CM

## 2018-11-26 PROCEDURE — 77080 DXA BONE DENSITY AXIAL: CPT

## 2018-12-31 RX ORDER — TRIAMTERENE/HYDROCHLOROTHIAZID 37.5-25 MG
TABLET ORAL
Qty: 90 TABLET | Refills: 2 | Status: SHIPPED | OUTPATIENT
Start: 2018-12-31 | End: 2019-10-08 | Stop reason: SDUPTHER

## 2018-12-31 RX ORDER — SIMVASTATIN 20 MG/1
TABLET, FILM COATED ORAL
Qty: 90 TABLET | Refills: 2 | Status: SHIPPED | OUTPATIENT
Start: 2018-12-31 | End: 2019-10-08 | Stop reason: SDUPTHER

## 2018-12-31 RX ORDER — BISOPROLOL FUMARATE 5 MG/1
TABLET, FILM COATED ORAL
Qty: 90 TABLET | Refills: 2 | Status: SHIPPED | OUTPATIENT
Start: 2018-12-31 | End: 2019-10-08 | Stop reason: SDUPTHER

## 2019-01-08 ENCOUNTER — ANESTHESIA EVENT (OUTPATIENT)
Dept: ENDOSCOPY | Facility: HOSPITAL | Age: 74
End: 2019-01-08
Payer: MEDICARE

## 2019-01-08 ENCOUNTER — ANESTHESIA (OUTPATIENT)
Dept: ENDOSCOPY | Facility: HOSPITAL | Age: 74
End: 2019-01-08
Payer: MEDICARE

## 2019-01-08 ENCOUNTER — HOSPITAL ENCOUNTER (OUTPATIENT)
Facility: HOSPITAL | Age: 74
Discharge: HOME | End: 2019-01-08
Attending: COLON & RECTAL SURGERY | Admitting: COLON & RECTAL SURGERY
Payer: MEDICARE

## 2019-01-08 VITALS
OXYGEN SATURATION: 98 % | DIASTOLIC BLOOD PRESSURE: 75 MMHG | HEART RATE: 61 BPM | SYSTOLIC BLOOD PRESSURE: 130 MMHG | RESPIRATION RATE: 26 BRPM | TEMPERATURE: 96.5 F

## 2019-01-08 DIAGNOSIS — C18.3 MALIGNANT NEOPLASM OF HEPATIC FLEXURE (CMS/HCC): ICD-10-CM

## 2019-01-08 PROCEDURE — 88305 TISSUE EXAM BY PATHOLOGIST: CPT | Performed by: COLON & RECTAL SURGERY

## 2019-01-08 PROCEDURE — 37000002 HC ANESTHESIA MAC: Performed by: COLON & RECTAL SURGERY

## 2019-01-08 PROCEDURE — 75000011 HC COLONSCOPY BIOPSY: Performed by: COLON & RECTAL SURGERY

## 2019-01-08 PROCEDURE — 63600000 HC DRUGS/DETAIL CODE: Mod: JW | Performed by: NURSE ANESTHETIST, CERTIFIED REGISTERED

## 2019-01-08 PROCEDURE — 45380 COLONOSCOPY AND BIOPSY: CPT | Performed by: COLON & RECTAL SURGERY

## 2019-01-08 PROCEDURE — 200200 PR NO CHARGE: Performed by: COLON & RECTAL SURGERY

## 2019-01-08 PROCEDURE — 25800000 HC PHARMACY IV SOLUTIONS: Performed by: COLON & RECTAL SURGERY

## 2019-01-08 PROCEDURE — 0DBP8ZX EXCISION OF RECTUM, VIA NATURAL OR ARTIFICIAL OPENING ENDOSCOPIC, DIAGNOSTIC: ICD-10-PCS | Performed by: COLON & RECTAL SURGERY

## 2019-01-08 RX ORDER — PROPOFOL 10 MG/ML
INJECTION, EMULSION INTRAVENOUS CONTINUOUS PRN
Status: DISCONTINUED | OUTPATIENT
Start: 2019-01-08 | End: 2019-01-08 | Stop reason: SURG

## 2019-01-08 RX ORDER — SODIUM CHLORIDE 9 MG/ML
INJECTION, SOLUTION INTRAVENOUS CONTINUOUS
Status: DISCONTINUED | OUTPATIENT
Start: 2019-01-08 | End: 2019-01-08 | Stop reason: HOSPADM

## 2019-01-08 RX ORDER — PROPOFOL 200MG/20ML
SYRINGE (ML) INTRAVENOUS AS NEEDED
Status: DISCONTINUED | OUTPATIENT
Start: 2019-01-08 | End: 2019-01-08 | Stop reason: SURG

## 2019-01-08 RX ADMIN — SODIUM CHLORIDE: 9 INJECTION, SOLUTION INTRAVENOUS at 11:19

## 2019-01-08 RX ADMIN — PROPOFOL 50 MG: 10 INJECTION, EMULSION INTRAVENOUS at 11:25

## 2019-01-08 RX ADMIN — PROPOFOL 150 MCG/KG/MIN: 10 INJECTION, EMULSION INTRAVENOUS at 11:25

## 2019-01-08 ASSESSMENT — PAIN SCALES - GENERAL: PAIN_LEVEL: 0

## 2019-01-08 NOTE — ANESTHESIA PREPROCEDURE EVALUATION
Anesthesia ROS/MED HX      Cardiovascular   CAD   hypertension   cardiomyopathy or myocarditis  Endo/Other  History of cancer and colon cancer  ROS/MED HX Comments:    Cardiology: EF = 45 %      Past Surgical History:   Procedure Laterality Date   • BOWEL RESECTION     • COLONOSCOPY      2 total colonoscopy    • MOHS SURGERY  03/2018   • TUBAL LIGATION         Past Medical History:   Diagnosis Date   • Basal cell carcinoma     On nose   • Colon cancer (CMS/HCC) (HCC)     Diagnosed at age 72   • HBP (high blood pressure)    • High cholesterol    • Melanoma (CMS/HCC) (HCC)     ON back   • Neuropathy    • Screening for breast cancer     Annual mammograms         Physical Exam    Airway   Mallampati: III   TM distance: >3 FB   Neck ROM: full  Cardiovascular    Rhythm: regular   Rate: normal  Pulmonary    Decreased breath sounds  Dental - normal        Anesthesia Plan    Plan: MAC    Technique: MAC     Airway: natural airway / supplemental oxygen   ASA 3  Anesthetic plan and risks discussed with: patient  Induction:    intravenous   Postop Plan:   Patient Disposition: phase II then home   Pain Management: IV analgesics  Comments:    Plan: MAC with GA backup

## 2019-01-08 NOTE — OR SURGEON
Pre-Procedure patient identification:  I am the primary operating surgeon/proceduralist and I have identified the patient on 01/08/19 at 11:15 AM Jae Xiong MD  Phone Number: [phone]

## 2019-01-08 NOTE — ANESTHESIA POSTPROCEDURE EVALUATION
Patient: Concepcion Romero    Procedure Summary     Date:  01/08/19 Room / Location:  [facility] (D) / [facility]    Anesthesia Start:  1119 Anesthesia Stop:  1145    Procedure:  FLEXIBLE COLONOSCOPY PROXIMAL TO SPLENIC FLEXURE WITH BIOPSY (N/A Anus) Diagnosis:       Malignant neoplasm of hepatic flexure (CMS/HCC)      (Malignant neoplasm of hepatic flexure (CMS/HCC) [C18.3])    Provider:  Jae Xiong MD Responsible Provider:  Bunny David MD    Anesthesia Type:  MAC ASA Status:  3          Anesthesia Type: MAC  PACU Vitals  1/8/2019 1141 - 1/8/2019 1241      1/8/2019 1146 1/8/2019 1150 1/8/2019 1155 1/8/2019 1210    BP: 122/61 124/64 122/65 130/75    Temp: (!)  35.8 °C (96.5 °F) - - -    Pulse: 61 60 61 61    Resp: 20 15 14 (!)  26    SpO2: 100 % 100 % 100 % 98 %            Anesthesia Post Evaluation    Pain score: 0  Pain management: satisfactory to patient  Mode of pain management: IV medication  Patient location during evaluation: PACU  Patient participation: complete - patient participated  Level of consciousness: awake and alert  Cardiovascular status: acceptable  Airway Patency: adequate  Respiratory status: acceptable  Hydration status: stable  Anesthetic complications: no

## 2019-01-08 NOTE — OR SURGEON
Pre-Procedure patient identification:  I am the primary operating surgeon/proceduralist and I have identified the patient on 01/08/19 at 11:21 AM Jae Xiong MD  Phone Number: [phone]

## 2019-01-08 NOTE — H&P
"Concepcion Romero is a 73 y.o. 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  She is now 10 months postop.     She has no unexplained weight loss.  Her energy level has been normal.  She is having excellent bowel function with bowel movements once a day or every other day.     She is due to see Dr. Kinsey today and will be getting a follow-up CEA.           Medical History:   Medical History        Past Medical History:   Diagnosis Date   • Basal cell carcinoma       On nose   • Colon cancer (CMS/HCC) (HCC)       Diagnosed at age 72   • HBP (high blood pressure)     • High cholesterol     • Melanoma (CMS/HCC) (HCC)       ON back   • Neuropathy     • Screening for breast cancer       Annual mammograms            Surgical History:   Surgical History   Past Surgical History:   Procedure Laterality Date   • BOWEL RESECTION       • COLONOSCOPY         2 total colonoscopy    • MOHS SURGERY   03/2018   • TUBAL LIGATION                Allergies: Ciprofloxacin     Current Medications:  •  B-complex with vitamin C  •  bisoprolol  •  cholecalciferol (vitamin D3)  •  gabapentin  •  multivit-min-FA-lycopen-lutein  •  simvastatin  •  triamterene-hydrochlorothiazide  •  zolpidem  •  bisacodyl  •  polyethylene glycol        Objective         Physicial Exam  /81 (BP Location: Right forearm, Patient Position: Sitting)   Pulse 72   Temp 36.4 °C (97.5 °F) (Temporal)   Ht 1.588 m (5' 2.5\")   Wt 92.1 kg (203 lb)   BMI 36.54 kg/m²      Physical Exam   Constitutional: She is oriented to person, place, and time. She appears well-developed and well-nourished.   HENT:   Head: Normocephalic and atraumatic.   Neck: Normal range of motion. Neck supple.   Cardiovascular: Normal rate, regular rhythm and normal heart sounds.    Pulmonary/Chest: Breath sounds normal. She has no wheezes.   Abdominal: Soft. She exhibits no distension and no mass. There is no hepatomegaly. There is no " tenderness.   Robotic port sites have healed beautifully.  The specimen extraction site also looks good.  There are no nodules at the incisions.   Musculoskeletal: Normal range of motion.   Lymphadenopathy:     She has no cervical adenopathy.        Right: No inguinal adenopathy present.        Left: No inguinal adenopathy present.   Neurological: She is alert and oriented to person, place, and time.   Skin: Skin is warm and dry. No rash noted.   Psychiatric: She has a normal mood and affect.   Vitals reviewed.                CEA   Date Value Ref Range Status   07/16/2018 1.1 ng/mL Final       Comment:       ADULT REFERENCE RANGE  Non-smokers: <3.0 ng/mL  Smokers: <5.0 ng/mL  Serum CEA levels, regardless of value, should not be interpreted as absolute evidence of the presence or absence of disease. The CEA value should be used in conjunction with information available from the clinical evaluation and other diagnostic procedures.   02/07/2018 6.3 NG/ML Final       Comment:       ADULT REFERENCE RANGE:  Non-smokers: < 3.0 ng/mL  Smokers: < 5.0 ng/mL  Serum CEA levels, regardless of value, should not be interpreted as absolute evidence of the presence or absence of disease.  The CEA value should be used in conjunction with information available from the clinical evaluation and other diagnostic procedures. CEA assays should not be used as a cancer screening test.                 Cea   Date Value Ref Range Status   05/03/2018 0.5 See Note: ng/mL Final       Comment:       Reference Range:  Non-Smoker: <2.5  Smoker:     <5.0        This test was performed using the Siemens   chemiluminescent method. Values obtained from  different assay methods cannot be used  interchangeably. CEA levels, regardless of  value, should not be interpreted as absolute  evidence of the presence or absence of disease.         ]        Assessment             Problem List Items Addressed This Visit      Malignant neoplasm of hepatic flexure  (CMS/HCC) - Primary     Overview       FIT+, Anemia  11/14/17 -colonoscopy (me) hepatic flexure mass.  s/p Robotic RHC 1/10/18 for bulky Hepatic flexure tumor  Stage II. No adjuvant Therapy.  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 Negative genetic testing.           Current Assessment & Plan       Concepcion does not have any clinical sign of cancer recurrence.  We will follow-up on her CEA.  She will be due for colonoscopy in January, so we will get that scheduled.  We discussed the reason for colonoscopy and high-risk cancer screening.  We discussed the procedure and the risks and benefits of colonoscopy including the risk of perforation and bleeding from a polyp site.  We discussed bowel prep instructions.  she understands these issues and consents.              Relevant Orders     Case request operating room: FLEXIBLE COLONOSCOPY FOR COLORECTAL CANCER SCREENING HIGH RISK (Completed)

## 2019-01-08 NOTE — OP NOTE
_______________________________________________________________________________  Patient Name: Concepcion Odonnell          Procedure Date: 1/8/2019 11:14 AM  MRN: [id number]                     Account Number: [id number]  YOB: 1945              Age: 73  Gender: Female                        Note Status: Finalized  Attending MD: KIMBERLY IVERSON MD  _______________________________________________________________________________  Procedure:           Colonoscopy  Indications:         High risk colon cancer surveillance: Personal history of  colon cancer  Providers:           KIMBERLY IVERSON MD (Doctor), Anne Marie Duran RN (Nurse)  Referring MD:        YURY LARSON MD  Requesting Provider:  Medicines:           Monitored Anesthesia Care  Procedure:           After I obtained informed consent, the scope was passed  under direct vision. Throughout the procedure, the  patient's blood pressure, pulse, and oxygen saturations  were monitored continuously. The Colonoscope was  introduced through the anus and advanced to the cecum,  identified by appendiceal orifice and ileocecal valve.  The colonoscopy was performed without difficulty. The  patient tolerated the procedure well. The quality of the  bowel preparation was good.  Findings:  There was evidence of a prior side-to-side ileo-colonic anastomosis in  the proximal transverse colon. This was patent and was characterized by  healthy appearing mucosa. The anastomosis was traversed.  Scattered small-mouthed diverticula were found in the sigmoid colon.  Three benign appearing sessile polyps were found in the rectum. The  polyps were diminutive in size. These polyps were removed with a cold  biopsy forceps. Resection and retrieval were complete.  The exam was otherwise without abnormality on direct and retroflexion  views.  Impression:          - Patent side-to-side ileo-colonic anastomosis,  characterized by healthy appearing mucosa.  - Diverticulosis in the  sigmoid colon.  - Three benign appearing diminutive polyps in the  rectum, removed with a cold biopsy forceps. Resected and  retrieved.  - The examination was otherwise normal on direct and  retroflexion views.  Recommendation:      - Telephone my office for pathology results in 1 week.  - Repeat colonoscopy in 3 years for surveillance after  piecemeal polypectomy.  Procedure Code(s):   --- Professional ---  04435, Colonoscopy, flexible; with biopsy, single or  multiple  Diagnosis Code(s):   --- Professional ---  Z85.038, Personal history of other malignant neoplasm of  large intestine  Z98.0, Intestinal bypass and anastomosis status  K62.1, Rectal polyp  CPT copyright 2015 [employer]. All rights reserved.  The codes documented in this report are preliminary and upon  review may  be revised to meet current compliance requirements.  Attending Participation:  I personally performed the entire procedure.  Jae vIerson JR, MD  __________________  JAE IVERSON MD  1/8/2019 11:50:42 AM  This report has been signed electronically.  Number of Addenda: 0  Note Initiated On: 1/8/2019 11:14 AM

## 2019-01-09 LAB
CASE RPRT: NORMAL
CLINICAL INFO: NORMAL
PATH REPORT.FINAL DX SPEC: NORMAL
PATH REPORT.GROSS SPEC: NORMAL

## 2019-01-11 ENCOUNTER — TELEPHONE (OUTPATIENT)
Dept: SURGERY | Facility: CLINIC | Age: 74
End: 2019-01-11

## 2019-01-29 ENCOUNTER — OFFICE VISIT (OUTPATIENT)
Dept: PRIMARY CARE | Facility: CLINIC | Age: 74
End: 2019-01-29
Payer: MEDICARE

## 2019-01-29 VITALS
DIASTOLIC BLOOD PRESSURE: 74 MMHG | RESPIRATION RATE: 16 BRPM | TEMPERATURE: 97.5 F | BODY MASS INDEX: 34.98 KG/M2 | HEART RATE: 57 BPM | WEIGHT: 203.8 LBS | SYSTOLIC BLOOD PRESSURE: 114 MMHG | OXYGEN SATURATION: 94 %

## 2019-01-29 DIAGNOSIS — G47.00 INSOMNIA, UNSPECIFIED TYPE: ICD-10-CM

## 2019-01-29 DIAGNOSIS — E78.2 MIXED HYPERLIPIDEMIA: ICD-10-CM

## 2019-01-29 DIAGNOSIS — I10 ESSENTIAL HYPERTENSION: Primary | ICD-10-CM

## 2019-01-29 DIAGNOSIS — J06.9 VIRAL UPPER RESPIRATORY TRACT INFECTION: ICD-10-CM

## 2019-01-29 PROCEDURE — 99214 OFFICE O/P EST MOD 30 MIN: CPT | Performed by: FAMILY MEDICINE

## 2019-01-29 RX ORDER — ASPIRIN 81 MG/1
81 TABLET ORAL DAILY
COMMUNITY
End: 2019-07-08 | Stop reason: ALTCHOICE

## 2019-01-29 ASSESSMENT — ENCOUNTER SYMPTOMS
RESPIRATORY NEGATIVE: 1
CARDIOVASCULAR NEGATIVE: 1
RHINORRHEA: 1
SLEEP DISTURBANCE: 1

## 2019-01-29 NOTE — PROGRESS NOTES
Subjective      Patient ID: Concepcion Romero is a 73 y.o. female.  1945      HPI URI sx, no fever, chills or night sweats  Has not been taking anything for it  Insomnia sometime, wonders if ok to take melatonin  bp ck  Due for fast labs  Follows with onc for h/o colon ca and doing well    Review of Systems   HENT: Positive for congestion, postnasal drip and rhinorrhea.    Respiratory: Negative.    Cardiovascular: Negative.    Psychiatric/Behavioral: Positive for sleep disturbance.       Allergies   Allergen Reactions   • Ciprofloxacin Itching         Current Outpatient Prescriptions:   •  aspirin (ASPIR-81) 81 mg enteric coated tablet, Take 81 mg by mouth daily., Disp: , Rfl:   •  B-complex with vitamin C tablet, Take 1 tablet by mouth daily., Disp: , Rfl:   •  bisoprolol (ZEBETA) 5 mg tablet, TAKE 1 TABLET DAILY, Disp: 90 tablet, Rfl: 2  •  gabapentin (NEURONTIN) 300 mg capsule, Take 600 mg by mouth nightly.  , Disp: , Rfl:   •  ibuprofen (MOTRIN) 200 mg tablet, Take 400 mg by mouth 2 (two) times a day. 2 tabs in am and 2 tabs in pm , Disp: , Rfl:   •  multivit-min-FA-lycopen-lutein tablet, Take 1 tablet by mouth daily., Disp: , Rfl:   •  simvastatin (ZOCOR) 20 mg tablet, TAKE 1 TABLET DAILY, Disp: 90 tablet, Rfl: 2  •  triamterene-hydrochlorothiazide (MAXZIDE-25) 37.5-25 mg per tablet, TAKE 1 TABLET DAILY, Disp: 90 tablet, Rfl: 2    Past Medical History:   Diagnosis Date   • Basal cell carcinoma     On nose   • Colon cancer (CMS/HCC) (HCC)     Diagnosed at age 72   • HBP (high blood pressure)    • High cholesterol    • Melanoma (CMS/HCC) (HCC)     ON back   • Neuropathy    • Screening for breast cancer     Annual mammograms       Objective     Vitals:    01/29/19 1102 01/29/19 1130   BP: (!) 142/70 114/74   BP Location: Left upper arm    Pulse: (!) 57    Resp: 16    Temp: 36.4 °C (97.5 °F)    TempSrc: Oral    SpO2: 94%    Weight: 92.4 kg (203 lb 12.8 oz)      Body mass index is 34.98 kg/m².    Physical Exam    Constitutional: She is oriented to person, place, and time. She appears well-developed and well-nourished. No distress.   HENT:   Head: Normocephalic and atraumatic.   Right Ear: Hearing, tympanic membrane, external ear and ear canal normal.   Left Ear: Hearing, tympanic membrane, external ear and ear canal normal.   Mouth/Throat: Oropharynx is clear and moist. No oropharyngeal exudate.   Eyes: Conjunctivae are normal.   Neck: Neck supple. No thyromegaly present.   Cardiovascular: Normal rate, regular rhythm and normal heart sounds.  Exam reveals no friction rub.    No murmur heard.  Pulmonary/Chest: Effort normal and breath sounds normal. No respiratory distress. She has no wheezes. She has no rales.   Musculoskeletal: She exhibits no edema.   Lymphadenopathy:     She has no cervical adenopathy.   Neurological: She is alert and oriented to person, place, and time.   Skin: She is not diaphoretic.   Psychiatric: She has a normal mood and affect. Her behavior is normal. Judgment and thought content normal.   Nursing note and vitals reviewed.      Assessment/Plan   Problem List Items Addressed This Visit        Other    HTN (hypertension) - Primary     bp is good today  She most likely will be moving to [place] so will be getting a FMD there.  She will continue to see Dr. Kinsey and Dr. Xiong         Relevant Orders    CBC and differential    Comprehensive metabolic panel    Lipid panel    Hepatic function panel    TSH    Hyperlipidemia     To get fast labs, stable on statin         Relevant Orders    CBC and differential    Comprehensive metabolic panel    Lipid panel    Hepatic function panel    TSH      Other Visit Diagnoses     Viral upper respiratory tract infection        push fluids, saline      Insomnia, unspecified type        low dose melatonin prn

## 2019-01-29 NOTE — ASSESSMENT & PLAN NOTE
bp is good today  She most likely will be moving to [place] so will be getting a FMD there.  She will continue to see Dr. Kinsey and Dr. Xiong

## 2019-02-07 ENCOUNTER — LAB REQUISITION (OUTPATIENT)
Dept: LAB | Facility: HOSPITAL | Age: 74
End: 2019-02-07
Payer: MEDICARE

## 2019-02-07 DIAGNOSIS — D50.9 IRON DEFICIENCY ANEMIA: ICD-10-CM

## 2019-02-07 DIAGNOSIS — C18.0 MALIGNANT NEOPLASM OF CECUM (CMS/HCC): ICD-10-CM

## 2019-02-07 LAB
BASOPHILS # BLD: 0.03 K/UL (ref 0.01–0.1)
BASOPHILS NFR BLD: 0.5 %
CEA SERPL-MCNC: 1 NG/ML
DIFFERENTIAL METHOD BLD: NORMAL
EOSINOPHIL # BLD: 0.1 K/UL (ref 0.04–0.36)
EOSINOPHIL NFR BLD: 1.6 %
ERYTHROCYTE [DISTWIDTH] IN BLOOD BY AUTOMATED COUNT: 12 % (ref 11.7–14.4)
HCT VFR BLDCO AUTO: 41.2 %
HGB BLD-MCNC: 14.4 G/DL
IMM GRANULOCYTES # BLD AUTO: 0.02 K/UL (ref 0–0.08)
IMM GRANULOCYTES NFR BLD AUTO: 0.3 %
LYMPHOCYTES # BLD: 1.43 K/UL (ref 1.2–3.5)
LYMPHOCYTES NFR BLD: 23.5 %
MCH RBC QN AUTO: 31.6 PG (ref 28–33.2)
MCHC RBC AUTO-ENTMCNC: 35 G/DL (ref 32.2–35.5)
MCV RBC AUTO: 90.5 FL (ref 83–98)
MONOCYTES # BLD: 0.54 K/UL (ref 0.28–0.8)
MONOCYTES NFR BLD: 8.9 %
NEUTROPHILS # BLD: 3.97 K/UL (ref 1.7–7)
NEUTS SEG NFR BLD: 65.2 %
NRBC BLD-RTO: 0 %
PDW BLD AUTO: 8.6 FL (ref 9.4–12.3)
PLATELET # BLD AUTO: 152 K/UL
RBC # BLD AUTO: 4.55 M/UL (ref 3.93–5.22)
WBC # BLD AUTO: 6.09 K/UL

## 2019-02-07 PROCEDURE — 82378 CARCINOEMBRYONIC ANTIGEN: CPT | Performed by: INTERNAL MEDICINE

## 2019-02-07 PROCEDURE — 85025 COMPLETE CBC W/AUTO DIFF WBC: CPT | Performed by: INTERNAL MEDICINE

## 2019-02-07 PROCEDURE — 36415 COLL VENOUS BLD VENIPUNCTURE: CPT | Performed by: INTERNAL MEDICINE

## 2019-03-15 RX ORDER — ZOLPIDEM TARTRATE 5 MG/1
5 TABLET ORAL NIGHTLY PRN
Qty: 30 TABLET | Refills: 2 | Status: SHIPPED | OUTPATIENT
Start: 2019-03-15 | End: 2020-02-14 | Stop reason: SDUPTHER

## 2019-03-18 RX ORDER — GABAPENTIN 300 MG/1
CAPSULE ORAL
Qty: 180 CAPSULE | Refills: 2 | Status: SHIPPED | OUTPATIENT
Start: 2019-03-18 | End: 2019-03-29

## 2019-03-20 LAB
ALBUMIN SERPL-MCNC: 4.2 G/DL (ref 3.6–5.1)
ALBUMIN SERPL-MCNC: 4.2 G/DL (ref 3.6–5.1)
ALBUMIN/GLOB SERPL: 1.8 (CALC) (ref 1–2.5)
ALBUMIN/GLOB SERPL: 1.8 (CALC) (ref 1–2.5)
ALP SERPL-CCNC: 60 U/L (ref 33–130)
ALP SERPL-CCNC: 60 U/L (ref 33–130)
ALT SERPL-CCNC: 19 U/L (ref 6–29)
ALT SERPL-CCNC: 19 U/L (ref 6–29)
AST SERPL-CCNC: 21 U/L (ref 10–35)
AST SERPL-CCNC: 21 U/L (ref 10–35)
BASOPHILS # BLD AUTO: 29 CELLS/UL (ref 0–200)
BASOPHILS NFR BLD AUTO: 0.7 %
BILIRUB DIRECT SERPL-MCNC: 0.1 MG/DL
BILIRUB INDIRECT SERPL-MCNC: 0.6 MG/DL (CALC) (ref 0.2–1.2)
BILIRUB SERPL-MCNC: 0.7 MG/DL (ref 0.2–1.2)
BILIRUB SERPL-MCNC: 0.7 MG/DL (ref 0.2–1.2)
BUN SERPL-MCNC: 10 MG/DL (ref 7–25)
BUN/CREAT SERPL: ABNORMAL (CALC) (ref 6–22)
CALCIUM SERPL-MCNC: 9.5 MG/DL (ref 8.6–10.4)
CHLORIDE SERPL-SCNC: 96 MMOL/L (ref 98–110)
CHOLEST SERPL-MCNC: 184 MG/DL
CHOLEST/HDLC SERPL: 3.5 (CALC)
CO2 SERPL-SCNC: 31 MMOL/L (ref 20–32)
CREAT SERPL-MCNC: 0.8 MG/DL (ref 0.6–0.93)
EOSINOPHIL # BLD AUTO: 122 CELLS/UL (ref 15–500)
EOSINOPHIL NFR BLD AUTO: 2.9 %
ERYTHROCYTE [DISTWIDTH] IN BLOOD BY AUTOMATED COUNT: 11.9 % (ref 11–15)
GLOBULIN SER CALC-MCNC: 2.4 G/DL (CALC) (ref 1.9–3.7)
GLOBULIN SER CALC-MCNC: 2.4 G/DL (CALC) (ref 1.9–3.7)
GLUCOSE SERPL-MCNC: 93 MG/DL (ref 65–99)
HCT VFR BLD AUTO: 43.6 % (ref 35–45)
HDLC SERPL-MCNC: 53 MG/DL
HGB BLD-MCNC: 15 G/DL (ref 11.7–15.5)
LDLC SERPL CALC-MCNC: 94 MG/DL (CALC)
LYMPHOCYTES # BLD AUTO: 1138 CELLS/UL (ref 850–3900)
LYMPHOCYTES NFR BLD AUTO: 27.1 %
MCH RBC QN AUTO: 31.5 PG (ref 27–33)
MCHC RBC AUTO-ENTMCNC: 34.4 G/DL (ref 32–36)
MCV RBC AUTO: 91.6 FL (ref 80–100)
MONOCYTES # BLD AUTO: 370 CELLS/UL (ref 200–950)
MONOCYTES NFR BLD AUTO: 8.8 %
NEUTROPHILS # BLD AUTO: 2541 CELLS/UL (ref 1500–7800)
NEUTROPHILS NFR BLD AUTO: 60.5 %
NONHDLC SERPL-MCNC: 131 MG/DL (CALC)
PLATELET # BLD AUTO: 166 THOUSAND/UL (ref 140–400)
PMV BLD REES-ECKER: 9.3 FL (ref 7.5–12.5)
POTASSIUM SERPL-SCNC: 4.1 MMOL/L (ref 3.5–5.3)
PROT SERPL-MCNC: 6.6 G/DL (ref 6.1–8.1)
PROT SERPL-MCNC: 6.6 G/DL (ref 6.1–8.1)
QUEST EGFR NON-AFR. AMERICAN: 72 ML/MIN/1.73M2
RBC # BLD AUTO: 4.76 MILLION/UL (ref 3.8–5.1)
SODIUM SERPL-SCNC: 133 MMOL/L (ref 135–146)
TRIGL SERPL-MCNC: 241 MG/DL
TSH SERPL-ACNC: 0.97 MIU/L (ref 0.4–4.5)
WBC # BLD AUTO: 4.2 THOUSAND/UL (ref 3.8–10.8)

## 2019-03-29 ENCOUNTER — OFFICE VISIT (OUTPATIENT)
Dept: FAMILY MEDICINE | Facility: CLINIC | Age: 74
End: 2019-03-29
Payer: MEDICARE

## 2019-03-29 VITALS
HEIGHT: 64 IN | TEMPERATURE: 97.8 F | WEIGHT: 205 LBS | RESPIRATION RATE: 16 BRPM | OXYGEN SATURATION: 97 % | SYSTOLIC BLOOD PRESSURE: 118 MMHG | DIASTOLIC BLOOD PRESSURE: 80 MMHG | HEART RATE: 83 BPM | BODY MASS INDEX: 35 KG/M2

## 2019-03-29 DIAGNOSIS — J32.1 CHRONIC FRONTAL SINUSITIS: Primary | ICD-10-CM

## 2019-03-29 PROCEDURE — 99214 OFFICE O/P EST MOD 30 MIN: CPT | Performed by: FAMILY MEDICINE

## 2019-03-29 RX ORDER — AMOXICILLIN AND CLAVULANATE POTASSIUM 875; 125 MG/1; MG/1
1 TABLET, FILM COATED ORAL 2 TIMES DAILY
Qty: 10 TABLET | Refills: 0 | Status: SHIPPED | OUTPATIENT
Start: 2019-03-29 | End: 2019-03-29 | Stop reason: SDUPTHER

## 2019-03-29 RX ORDER — MOMETASONE FUROATE MONOHYDRATE 50 UG/1
2 SPRAY, METERED NASAL DAILY
Qty: 17 G | Refills: 1 | Status: SHIPPED | OUTPATIENT
Start: 2019-03-29 | End: 2019-07-08 | Stop reason: ALTCHOICE

## 2019-03-29 RX ORDER — MOMETASONE FUROATE MONOHYDRATE 50 UG/1
2 SPRAY, METERED NASAL DAILY
Qty: 17 G | Refills: 5 | Status: SHIPPED | OUTPATIENT
Start: 2019-03-29 | End: 2019-03-29

## 2019-03-29 RX ORDER — GABAPENTIN 300 MG/1
600 CAPSULE ORAL NIGHTLY
COMMUNITY
End: 2019-07-08 | Stop reason: ALTCHOICE

## 2019-03-29 RX ORDER — AMOXICILLIN AND CLAVULANATE POTASSIUM 875; 125 MG/1; MG/1
1 TABLET, FILM COATED ORAL 2 TIMES DAILY
Qty: 10 TABLET | Refills: 0 | Status: SHIPPED | OUTPATIENT
Start: 2019-03-29 | End: 2019-07-08 | Stop reason: ALTCHOICE

## 2019-03-29 NOTE — PROGRESS NOTES
Subjective      Patient ID: Concepcion Romero is a 73 y.o. female.  1945      HPI  On and off sinus congestion, h/a, yellow sinus drainage for several months, no fever, chills, night sweats. No cough  Has been using sinex saline spray w/o relief.  Some ear pain on and off, saw her dentist who told her its coming from her sinuses.  Nothing makes it better or worse, just comes and goes.  Review of Systems    Allergies   Allergen Reactions   • Ciprofloxacin Itching         Current Outpatient Prescriptions:   •  B-complex with vitamin C tablet, Take 1 tablet by mouth daily., Disp: , Rfl:   •  bisoprolol (ZEBETA) 5 mg tablet, TAKE 1 TABLET DAILY, Disp: 90 tablet, Rfl: 2  •  gabapentin (NEURONTIN) 300 mg capsule, Take 600 mg by mouth nightly., Disp: , Rfl:   •  ibuprofen (MOTRIN) 200 mg tablet, Take 400 mg by mouth 2 (two) times a day. 2 tabs in am and 2 tabs in pm , Disp: , Rfl:   •  multivit-min-FA-lycopen-lutein tablet, Take 1 tablet by mouth daily., Disp: , Rfl:   •  simvastatin (ZOCOR) 20 mg tablet, TAKE 1 TABLET DAILY, Disp: 90 tablet, Rfl: 2  •  triamterene-hydrochlorothiazide (MAXZIDE-25) 37.5-25 mg per tablet, TAKE 1 TABLET DAILY, Disp: 90 tablet, Rfl: 2  •  zolpidem (AMBIEN) 5 mg tablet, Take 1 tablet (5 mg total) by mouth nightly as needed for sleep., Disp: 30 tablet, Rfl: 2  •  amoxicillin-pot clavulanate (AUGMENTIN) 875-125 mg per tablet, Take 1 tablet by mouth 2 (two) times a day for 5 days., Disp: 10 tablet, Rfl: 0  •  aspirin (ASPIR-81) 81 mg enteric coated tablet, Take 81 mg by mouth daily., Disp: , Rfl:   •  mometasone (NASONEX) 50 mcg/actuation nasal spray, Administer 2 sprays into each nostril daily., Disp: 17 g, Rfl: 1    Past Medical History:   Diagnosis Date   • Basal cell carcinoma     On nose   • Colon cancer (CMS/HCC) (HCC)     Diagnosed at age 72   • HBP (high blood pressure)    • High cholesterol    • Melanoma (CMS/HCC) (HCC)     ON back   • Neuropathy    • Screening for breast cancer      "Annual mammograms       Objective     Vitals:    03/29/19 1113   BP: 118/80   BP Location: Right upper arm   Patient Position: Sitting   Pulse: 83   Resp: 16   Temp: 36.6 °C (97.8 °F)   TempSrc: Oral   SpO2: 97%   Weight: 93 kg (205 lb)   Height: 1.626 m (5' 4\")     Body mass index is 35.19 kg/m².    Physical Exam   Constitutional: She appears well-developed and well-nourished.   HENT:   Right Ear: External ear normal.   Left Ear: External ear normal.   Nose: Mucosal edema present.   Nasal turbinates swollen and cherry red  Yellow drainage   Eyes: Conjunctivae are normal.   Neck: Normal range of motion. Neck supple.   Cardiovascular: Normal rate, regular rhythm and normal heart sounds.    Pulmonary/Chest: Effort normal and breath sounds normal. No respiratory distress. She has no wheezes. She has no rales.   Nursing note and vitals reviewed.      Assessment/Plan   Problem List Items Addressed This Visit     None      Visit Diagnoses     Chronic frontal sinusitis    -  Primary    symptoms for several months  change to nasonex/augmentin x5 days  see ENT prn          "

## 2019-04-15 ENCOUNTER — TELEPHONE (OUTPATIENT)
Dept: FAMILY MEDICINE | Facility: CLINIC | Age: 74
End: 2019-04-15

## 2019-04-16 RX ORDER — FLUTICASONE PROPIONATE 50 MCG
1 SPRAY, SUSPENSION (ML) NASAL DAILY
Qty: 1 BOTTLE | Refills: 5 | Status: SHIPPED | OUTPATIENT
Start: 2019-04-16

## 2019-05-20 ENCOUNTER — LAB REQUISITION (OUTPATIENT)
Dept: LAB | Facility: HOSPITAL | Age: 74
End: 2019-05-20
Attending: INTERNAL MEDICINE
Payer: MEDICARE

## 2019-05-20 DIAGNOSIS — D50.9 IRON DEFICIENCY ANEMIA: ICD-10-CM

## 2019-05-20 DIAGNOSIS — C18.0 MALIGNANT NEOPLASM OF CECUM (CMS/HCC): ICD-10-CM

## 2019-05-20 LAB
BASOPHILS # BLD: 0.03 K/UL (ref 0.01–0.1)
BASOPHILS NFR BLD: 0.6 %
CEA SERPL-MCNC: 1 NG/ML
DIFFERENTIAL METHOD BLD: NORMAL
EOSINOPHIL # BLD: 0.09 K/UL (ref 0.04–0.36)
EOSINOPHIL NFR BLD: 1.8 %
ERYTHROCYTE [DISTWIDTH] IN BLOOD BY AUTOMATED COUNT: 12 % (ref 11.7–14.4)
HCT VFR BLDCO AUTO: 38 %
HGB BLD-MCNC: 13.2 G/DL
IMM GRANULOCYTES # BLD AUTO: 0.01 K/UL (ref 0–0.08)
IMM GRANULOCYTES NFR BLD AUTO: 0.2 %
LYMPHOCYTES # BLD: 1.38 K/UL (ref 1.2–3.5)
LYMPHOCYTES NFR BLD: 27 %
MCH RBC QN AUTO: 31.8 PG (ref 28–33.2)
MCHC RBC AUTO-ENTMCNC: 34.7 G/DL (ref 32.2–35.5)
MCV RBC AUTO: 91.6 FL (ref 83–98)
MONOCYTES # BLD: 0.49 K/UL (ref 0.28–0.8)
MONOCYTES NFR BLD: 9.6 %
NEUTROPHILS # BLD: 3.12 K/UL (ref 1.7–7)
NEUTS SEG NFR BLD: 60.8 %
NRBC BLD-RTO: 0 %
PDW BLD AUTO: 8.8 FL (ref 9.4–12.3)
PLATELET # BLD AUTO: 163 K/UL
RBC # BLD AUTO: 4.15 M/UL (ref 3.93–5.22)
WBC # BLD AUTO: 5.12 K/UL

## 2019-05-20 PROCEDURE — 82378 CARCINOEMBRYONIC ANTIGEN: CPT | Performed by: INTERNAL MEDICINE

## 2019-05-20 PROCEDURE — 36415 COLL VENOUS BLD VENIPUNCTURE: CPT | Performed by: INTERNAL MEDICINE

## 2019-05-20 PROCEDURE — 85025 COMPLETE CBC W/AUTO DIFF WBC: CPT | Performed by: INTERNAL MEDICINE

## 2019-07-08 ENCOUNTER — OFFICE VISIT (OUTPATIENT)
Dept: INTERNAL MEDICINE | Facility: CLINIC | Age: 74
End: 2019-07-08
Payer: MEDICARE

## 2019-07-08 VITALS
TEMPERATURE: 98.2 F | RESPIRATION RATE: 16 BRPM | WEIGHT: 208 LBS | DIASTOLIC BLOOD PRESSURE: 82 MMHG | OXYGEN SATURATION: 98 % | BODY MASS INDEX: 35.51 KG/M2 | HEIGHT: 64 IN | SYSTOLIC BLOOD PRESSURE: 110 MMHG | HEART RATE: 72 BPM

## 2019-07-08 DIAGNOSIS — I10 ESSENTIAL HYPERTENSION: ICD-10-CM

## 2019-07-08 DIAGNOSIS — E78.2 MIXED HYPERLIPIDEMIA: ICD-10-CM

## 2019-07-08 DIAGNOSIS — G60.9 IDIOPATHIC PERIPHERAL NEUROPATHY: Primary | ICD-10-CM

## 2019-07-08 DIAGNOSIS — Z12.31 VISIT FOR SCREENING MAMMOGRAM: ICD-10-CM

## 2019-07-08 PROCEDURE — 99204 OFFICE O/P NEW MOD 45 MIN: CPT | Performed by: INTERNAL MEDICINE

## 2019-07-08 RX ORDER — DULOXETIN HYDROCHLORIDE 30 MG/1
30 CAPSULE, DELAYED RELEASE ORAL DAILY
Qty: 90 CAPSULE | Refills: 1 | Status: SHIPPED | OUTPATIENT
Start: 2019-07-08 | End: 2019-10-08 | Stop reason: ALTCHOICE

## 2019-07-08 ASSESSMENT — ENCOUNTER SYMPTOMS
HEADACHES: 0
FEVER: 0
WHEEZING: 0
MYALGIAS: 1
LIGHT-HEADEDNESS: 0
DIARRHEA: 0
SHORTNESS OF BREATH: 1
DIZZINESS: 0
NUMBNESS: 1
FATIGUE: 0
SLEEP DISTURBANCE: 1
CHILLS: 0
BLOOD IN STOOL: 0

## 2019-07-08 NOTE — PROGRESS NOTES
"Subjective      Patient ID: Concepcion Romero is a 74 y.o. female.  1945      HPI    The patient presents for followup of chronic medical conditions. She has a history of peripheral neuropathy followed by neurology. Still has pain and numbness despite using gabapentin, was unable to tolerate higher dose. Is now open to lyrica or cymbalta.    Has a history of colon cancer and is followed by oncology and surgery. Has no bleeding, diarrhea, or significant fatigue.    She notes occasional insomnia for which she takes ambien as needed.    Has seasonal allergies with congestion. Notes some SOB in humid conditions.       Review of Systems   Constitutional: Negative for chills, fatigue and fever.   HENT: Positive for congestion.    Eyes: Negative for visual disturbance.   Respiratory: Positive for shortness of breath (in humidity). Negative for wheezing.    Cardiovascular: Negative for chest pain.   Gastrointestinal: Negative for blood in stool and diarrhea.   Musculoskeletal: Positive for myalgias.   Allergic/Immunologic: Positive for environmental allergies.   Neurological: Positive for numbness. Negative for dizziness, light-headedness and headaches.   Psychiatric/Behavioral: Positive for sleep disturbance.       Objective     Vitals:    07/08/19 1250   BP: 110/82   BP Location: Right upper arm   Patient Position: Sitting   Pulse: 72   Resp: 16   Temp: 36.8 °C (98.2 °F)   TempSrc: Oral   SpO2: 98%   Weight: 94.3 kg (208 lb)   Height: 1.626 m (5' 4\")     Body mass index is 35.7 kg/m².     Past Medical History:   Diagnosis Date   • Arthritis    • Basal cell carcinoma     On nose   • Colon cancer (CMS/HCC) (HCC)     Diagnosed at age 72   • HBP (high blood pressure)    • High cholesterol    • Insomnia    • Melanoma (CMS/HCC) (HCC)     ON back   • Neuropathy    • Screening for breast cancer     Annual mammograms     Past Surgical History:   Procedure Laterality Date   • BOWEL RESECTION     • COLONOSCOPY      2 total " colonoscopy    [facility]  [facility]   • TUBAL LIGATION       Family History   Problem Relation Age of Onset   • Heart disease Mother    • Heart disease Father    • Heart disease Brother      Social History     Social History   • Marital status:      Spouse name: N/A   • Number of children: N/A   • Years of education: N/A     Occupational History   • Not on file.     Social History Main Topics   • Smoking status: Former Smoker     Packs/day: 1.00     Quit date: 1985   • Smokeless tobacco: Never Used      Comment: from age [age]   • Alcohol use Yes      Comment: 2 glases wine on fri and sat, 1 glass on sunday   • Drug use: No   • Sexual activity: Not on file     Other Topics Concern   • Not on file     Social History Narrative   • No narrative on file       Current Outpatient Prescriptions:   •  B-complex with vitamin C tablet, Take 1 tablet by mouth daily., Disp: , Rfl:   •  bisoprolol (ZEBETA) 5 mg tablet, TAKE 1 TABLET DAILY, Disp: 90 tablet, Rfl: 2  •  fluticasone propionate (FLONASE) 50 mcg/actuation nasal spray, Administer 1 spray into each nostril daily., Disp: 1 Bottle, Rfl: 5  •  gabapentin (NEURONTIN) 300 mg capsule, Take 600 mg by mouth nightly., Disp: , Rfl:   •  ibuprofen (MOTRIN) 200 mg tablet, Take 400 mg by mouth 2 (two) times a day. 2 tabs in am and 2 tabs in pm , Disp: , Rfl:   •  multivit-min-FA-lycopen-lutein tablet, Take 1 tablet by mouth daily., Disp: , Rfl:   •  simvastatin (ZOCOR) 20 mg tablet, TAKE 1 TABLET DAILY, Disp: 90 tablet, Rfl: 2  •  triamterene-hydrochlorothiazide (MAXZIDE-25) 37.5-25 mg per tablet, TAKE 1 TABLET DAILY, Disp: 90 tablet, Rfl: 2  •  zolpidem (AMBIEN) 5 mg tablet, Take 1 tablet (5 mg total) by mouth nightly as needed for sleep., Disp: 30 tablet, Rfl: 2  Allergies   Allergen Reactions   • Ciprofloxacin Itching       Physical Exam   Constitutional: She is oriented to person, place, and time. She appears well-developed and well-nourished. No distress.   HENT:    Right Ear: External ear normal.   Left Ear: External ear normal.   Eyes: Conjunctivae and EOM are normal. Right eye exhibits no discharge. Left eye exhibits no discharge. No scleral icterus.   Neck: No JVD present. No tracheal deviation present.   Cardiovascular: Normal rate, regular rhythm and normal heart sounds.  Exam reveals no gallop and no friction rub.    No murmur heard.  Pulmonary/Chest: Effort normal and breath sounds normal. No stridor. No respiratory distress. She has no wheezes. She has no rales.   Neurological: She is alert and oriented to person, place, and time. Coordination normal.   Skin: Skin is warm.   Psychiatric: She has a normal mood and affect. Her behavior is normal. Judgment and thought content normal.       Assessment/Plan   Diagnoses and all orders for this visit:    Idiopathic peripheral neuropathy (Primary): Will wean off gabapentin and do trial of cymbalta. Will start at 30mg and follow up in several months.    Essential hypertension: Controlled, continue current management.    Mixed hyperlipidemia: Routine labs ordered.  -     Lipid panel; Future  -     Comprehensive metabolic panel; Future  -     CBC and differential; Future    Visit for screening mammogram  -     BI SCREENING MAMMOGRAM BILATERAL; Future    Other orders  -     DULoxetine (CYMBALTA) 30 mg capsule; Take 1 capsule (30 mg total) by mouth daily.

## 2019-07-22 PROBLEM — Z85.038 HISTORY OF COLON CANCER: Status: ACTIVE | Noted: 2019-07-22

## 2019-07-25 ENCOUNTER — TELEPHONE (OUTPATIENT)
Dept: INTERNAL MEDICINE | Facility: CLINIC | Age: 74
End: 2019-07-25

## 2019-07-25 NOTE — TELEPHONE ENCOUNTER
Pt called. She states that at her last appt with  on 7/8 they dicussed switching her from Gabapentin 300mg to Cymbalta 30mg. Pt is being precribed this medication to treat her idiopathic peripheral neuropathy which is normally treated through , but pt states she can not get in with  for a few months. So since prescribed through  pt is asking if she should be weaning herself off of Gabapentin before starting Cymbalta and if so what her new instructions should be. She is currently taking Gabapentin as directed. She does state that her feet are very uncomfortable and burns especially at night. She does use CBD cream but this has not helped .

## 2019-07-25 NOTE — TELEPHONE ENCOUNTER
"Per Dr. Salomon's note on 7/8:  \"Idiopathic peripheral neuropathy (Primary): Will wean off gabapentin and do trial of cymbalta. Will start at 30mg and follow up in several months.\"    Patient can wean off gabapentin and start cymbalta at the same time.  She does not need to be completely off gabapentin prior to starting cymbalta.  Cymbalta may take up to 2 weeks for her to even notice a difference so it would be better she start it at the same time that she start weaning off the gabapentin.  Dr. Salomon did not document specific weaning instructions but since she is on a low dose of 600 mg daily, she can wean by taking 1 capsule (300 mg) daily for 5 days, then 1 capsule (300 mg) every OTHER day for 5 days, and then stop.    Please relay instructions to patient.    I will also send to Dr. Salomon for her review upon her return in case she has any other suggestions.  "

## 2019-07-29 NOTE — TELEPHONE ENCOUNTER
Thanks Tiffanie, I gave her handwritten weaning instructions but sounds like she must have misplaced them.

## 2019-08-12 ENCOUNTER — LAB REQUISITION (OUTPATIENT)
Dept: LAB | Facility: HOSPITAL | Age: 74
End: 2019-08-12
Attending: INTERNAL MEDICINE
Payer: MEDICARE

## 2019-08-12 DIAGNOSIS — C18.0 MALIGNANT NEOPLASM OF CECUM (CMS/HCC): ICD-10-CM

## 2019-08-12 DIAGNOSIS — D50.9 IRON DEFICIENCY ANEMIA: ICD-10-CM

## 2019-08-12 LAB
BASOPHILS # BLD: 0.04 K/UL (ref 0.01–0.1)
BASOPHILS NFR BLD: 0.4 %
CEA SERPL-MCNC: 1.3 NG/ML
DIFFERENTIAL METHOD BLD: ABNORMAL
EOSINOPHIL # BLD: 0.09 K/UL (ref 0.04–0.36)
EOSINOPHIL NFR BLD: 0.9 %
ERYTHROCYTE [DISTWIDTH] IN BLOOD BY AUTOMATED COUNT: 11.9 % (ref 11.7–14.4)
HCT VFR BLDCO AUTO: 40.1 %
HGB BLD-MCNC: 14.2 G/DL
IMM GRANULOCYTES # BLD AUTO: 0.03 K/UL (ref 0–0.08)
IMM GRANULOCYTES NFR BLD AUTO: 0.3 %
LYMPHOCYTES # BLD: 1.45 K/UL (ref 1.2–3.5)
LYMPHOCYTES NFR BLD: 13.7 %
MCH RBC QN AUTO: 31.8 PG (ref 28–33.2)
MCHC RBC AUTO-ENTMCNC: 35.4 G/DL (ref 32.2–35.5)
MCV RBC AUTO: 89.9 FL (ref 83–98)
MONOCYTES # BLD: 0.74 K/UL (ref 0.28–0.8)
MONOCYTES NFR BLD: 7 %
NEUTROPHILS # BLD: 8.22 K/UL (ref 1.7–7)
NEUTS SEG NFR BLD: 77.7 %
NRBC BLD-RTO: 0 %
PDW BLD AUTO: 8.7 FL (ref 9.4–12.3)
PLATELET # BLD AUTO: 151 K/UL
RBC # BLD AUTO: 4.46 M/UL (ref 3.93–5.22)
WBC # BLD AUTO: 10.57 K/UL

## 2019-08-12 PROCEDURE — 82378 CARCINOEMBRYONIC ANTIGEN: CPT | Performed by: INTERNAL MEDICINE

## 2019-08-12 PROCEDURE — 85025 COMPLETE CBC W/AUTO DIFF WBC: CPT | Performed by: INTERNAL MEDICINE

## 2019-08-12 PROCEDURE — 36415 COLL VENOUS BLD VENIPUNCTURE: CPT | Performed by: INTERNAL MEDICINE

## 2019-09-03 ENCOUNTER — TELEPHONE (OUTPATIENT)
Dept: INTERNAL MEDICINE | Facility: CLINIC | Age: 74
End: 2019-09-03

## 2019-09-03 NOTE — TELEPHONE ENCOUNTER
Concepcion said that the neuropathy sxs are getting better. She is using CBD cream on her feet.  Also, the neuropathy is better on the cymbalta.    Re: the side effects, they are easing. She is not having the sweating anymore. At the beginning of taking the cymbalta, she was sl confused, but that has resolved also. She is most concerned about the SOB.    She would prefer to stay on the cymbalta, but her question is will the side effects lessen as she continues to take the medication?    She has a neuro appt 10/28/19.    Pt- [phone]

## 2019-09-03 NOTE — TELEPHONE ENCOUNTER
Pt has been on Cymbalta 30 mg dosing for the last 2 to 3 weeks. Pt is not doing well on the medication. She is having some side effects; weakness when standing a little bit of shortness of breath, excessively sweating. Some days are better but some days it isn't. She did stop taking the Gabapentin 2 days ago per Dr Salomon's instructions. Pt is wondering what you would advise. Will she feel better eventually or will symptoms go away? Please advise. Pt's # [phone]

## 2019-09-03 NOTE — TELEPHONE ENCOUNTER
I am glad to hear her neuropathy symptoms are better. I suspect that the side effects should continue to improve as she takes the medication so for now would continue it but she can let us know if the SOB persists or worsens. For now I would continue current dose.

## 2019-09-03 NOTE — TELEPHONE ENCOUNTER
Rosalba, please see if symptoms are worsening or improving as she is taking the medication longer. Are her neuropathy symptoms better with cymbalta or the same?    If her neuropathy is no better, we may consider switching back to gabapentin and weaning off the cymbalta given side effects. She also could reengage with neurology to see if they recommend anything different. Thanks!

## 2019-09-06 ENCOUNTER — TELEPHONE (OUTPATIENT)
Dept: INTERNAL MEDICINE | Facility: CLINIC | Age: 74
End: 2019-09-06

## 2019-09-06 ENCOUNTER — TELEPHONE (OUTPATIENT)
Dept: NEUROLOGY | Facility: CLINIC | Age: 74
End: 2019-09-06

## 2019-09-06 NOTE — TELEPHONE ENCOUNTER
Pt called. She was recommended to call office if her sob worsened. Pt states that she was having a very difficult time catching her breath in the store while picking up her prescription for cymbalta. She expressed her concerns with the pharmacist, who suggested pt not take another dose until she speak with her primary. Pt has not taken the medication since yesterday before her trip to the pharmacy and is due for a dose. Please advise. Pts # [phone]

## 2019-09-06 NOTE — TELEPHONE ENCOUNTER
She has an appt 10/24/2019.  She saw her primay Dr Salomon.  Dr Salomon prescribed Cymbalta 30mg in place of the Gabapentin 300mg twice a night.  The Gabapentin was not working and that is why she started the Cymbalta.  The Cymbalta is not working and left her short of breath.  She is winding down from Cymbalta now.  Her question is what should she take or any suggestions before her appt.    [phone]

## 2019-09-06 NOTE — TELEPHONE ENCOUNTER
Agree with patient reaching out to Dr. Montoya and she has already placed call regarding this. For now wean cymbalta as described and will await Dr. Montoya' response on further management.

## 2019-09-06 NOTE — TELEPHONE ENCOUNTER
Rosalba, I know you spoke with her a few days regarding this. I am not sure that the cymbalta is causing her SOB but as it seems to be the variable that has changed, I would recommend weaning off this medication (not stopping it all at once). I would recommend she take it every other day for two weeks and then stop it. She can contact her neurologist to determine further options for treatment for her neuropathy. I know that the cymbalta was helping but obviously if it is causing her significant side effects, we need to stop it.

## 2019-09-06 NOTE — TELEPHONE ENCOUNTER
"Informed pt of Dr Salomon's comments & med recommendation for weaning off cymbalta. Concepcion understood and will do.     She asked if when she is off the cymbalta, is she able to resume taking gabapentin 300 mg 2 caps hs to \"calm the nerves in my feet\"?    She has an appt w Dr Montoya in 7 wks...10/24/19.   Suggested she call to inquire if Dr Montoya would have other options for treatment of her neuropathy that he could offer earlier than her appt.    She will call.        "

## 2019-09-09 NOTE — TELEPHONE ENCOUNTER
Concepcion called to ask if she can just stop her Cymbalta. She was trying to d/c slowly by taking it every other day (per Dr. Salomon), but it is now causing her insomnia and GI issues. She started the cymbalta after failing on the gabapentin. She would like to know if there is another med she can try.

## 2019-09-13 ENCOUNTER — TELEPHONE (OUTPATIENT)
Dept: NEUROLOGY | Facility: CLINIC | Age: 74
End: 2019-09-13

## 2019-09-13 NOTE — TELEPHONE ENCOUNTER
Concepcion called because she is still experiencing SOB despite having stopped the Cymbalta. She is still not sleeping because her foot pain is its worst at night. Can she start the gabapentin again or try something else?

## 2019-09-26 ENCOUNTER — CLINICAL SUPPORT (OUTPATIENT)
Dept: INTERNAL MEDICINE | Facility: CLINIC | Age: 74
End: 2019-09-26
Payer: MEDICARE

## 2019-09-26 ENCOUNTER — TELEPHONE (OUTPATIENT)
Dept: INTERNAL MEDICINE | Facility: CLINIC | Age: 74
End: 2019-09-26

## 2019-09-26 DIAGNOSIS — R39.9 UTI SYMPTOMS: Primary | ICD-10-CM

## 2019-09-26 LAB
BACTERIA #/AREA URNS HPF: 1 /HPF
BILIRUB UR QL STRIP.AUTO: NEGATIVE MG/DL
BILIRUBIN, POC: NEGATIVE
BLOOD URINE, POC: POSITIVE
CLARITY UR REFRACT.AUTO: ABNORMAL
CLARITY, POC: ABNORMAL
COLOR UR AUTO: YELLOW
COLOR, POC: YELLOW
GLUCOSE UR STRIP.AUTO-MCNC: NEGATIVE MG/DL
GLUCOSE URINE, POC: NEGATIVE
HGB UR QL STRIP.AUTO: 2
HYALINE CASTS #/AREA URNS LPF: ABNORMAL /LPF
KETONES UR STRIP.AUTO-MCNC: NEGATIVE MG/DL
KETONES, POC: NEGATIVE
LEUKOCYTE EST, POC: ABNORMAL
LEUKOCYTE ESTERASE UR QL STRIP.AUTO: 3
NITRITE UR QL STRIP.AUTO: NEGATIVE
NITRITE, POC: NEGATIVE
PH UR STRIP.AUTO: 7.5 [PH]
PH, POC: 7
PROT UR QL STRIP.AUTO: 1
PROTEIN, POC: ABNORMAL
RBC #/AREA URNS HPF: ABNORMAL /HPF
SP GR UR REFRACT.AUTO: 1.01
SPECIFIC GRAVITY, POC: 1
SQUAMOUS #/AREA URNS HPF: ABNORMAL /HPF
UROBILINOGEN UR STRIP-ACNC: 0.2 EU/DL
WBC #/AREA URNS HPF: ABNORMAL /HPF

## 2019-09-26 PROCEDURE — 81001 URINALYSIS AUTO W/SCOPE: CPT | Performed by: INTERNAL MEDICINE

## 2019-09-26 PROCEDURE — 81002 URINALYSIS NONAUTO W/O SCOPE: CPT | Performed by: INTERNAL MEDICINE

## 2019-09-26 PROCEDURE — 87086 URINE CULTURE/COLONY COUNT: CPT | Performed by: INTERNAL MEDICINE

## 2019-09-26 RX ORDER — NITROFURANTOIN 25; 75 MG/1; MG/1
100 CAPSULE ORAL 2 TIMES DAILY
Qty: 10 CAPSULE | Refills: 0 | Status: SHIPPED | OUTPATIENT
Start: 2019-09-26 | End: 2019-10-08 | Stop reason: ALTCHOICE

## 2019-09-26 NOTE — TELEPHONE ENCOUNTER
She can come in on the diagnostic schedule to give urine sample. If she does so, I would also recommend that she bring her records from [facility] so we know what they gave her and if they did a urine culture. Thanks!

## 2019-09-26 NOTE — TELEPHONE ENCOUNTER
Please call re: she had a UTI about a week and a half ago. [facility] went to [facility] and they put her on antibiotics. Now it is back again. Please call. [phone]

## 2019-09-28 LAB
BACTERIA UR CULT: ABNORMAL
BACTERIA UR CULT: ABNORMAL

## 2019-09-30 ENCOUNTER — TELEPHONE (OUTPATIENT)
Dept: INTERNAL MEDICINE | Facility: CLINIC | Age: 74
End: 2019-09-30

## 2019-10-08 ENCOUNTER — OFFICE VISIT (OUTPATIENT)
Dept: INTERNAL MEDICINE | Facility: CLINIC | Age: 74
End: 2019-10-08
Payer: MEDICARE

## 2019-10-08 VITALS
BODY MASS INDEX: 35.03 KG/M2 | HEIGHT: 64 IN | SYSTOLIC BLOOD PRESSURE: 110 MMHG | RESPIRATION RATE: 16 BRPM | TEMPERATURE: 97.8 F | DIASTOLIC BLOOD PRESSURE: 80 MMHG | WEIGHT: 205.2 LBS | OXYGEN SATURATION: 98 % | HEART RATE: 78 BPM

## 2019-10-08 DIAGNOSIS — I10 ESSENTIAL HYPERTENSION: ICD-10-CM

## 2019-10-08 DIAGNOSIS — Z12.31 VISIT FOR SCREENING MAMMOGRAM: ICD-10-CM

## 2019-10-08 DIAGNOSIS — G60.9 IDIOPATHIC PERIPHERAL NEUROPATHY: ICD-10-CM

## 2019-10-08 DIAGNOSIS — Z00.00 MEDICARE ANNUAL WELLNESS VISIT, SUBSEQUENT: Primary | ICD-10-CM

## 2019-10-08 DIAGNOSIS — E78.2 MIXED HYPERLIPIDEMIA: ICD-10-CM

## 2019-10-08 LAB
ALBUMIN SERPL-MCNC: 4 G/DL (ref 3.6–5.1)
ALBUMIN/GLOB SERPL: 1.7 (CALC) (ref 1–2.5)
ALP SERPL-CCNC: 55 U/L (ref 33–130)
ALT SERPL-CCNC: 21 U/L (ref 6–29)
AST SERPL-CCNC: 22 U/L (ref 10–35)
BASOPHILS # BLD AUTO: 32 CELLS/UL (ref 0–200)
BASOPHILS NFR BLD AUTO: 0.5 %
BILIRUB SERPL-MCNC: 0.6 MG/DL (ref 0.2–1.2)
BUN SERPL-MCNC: 12 MG/DL (ref 7–25)
BUN/CREAT SERPL: ABNORMAL (CALC) (ref 6–22)
CALCIUM SERPL-MCNC: 9.5 MG/DL (ref 8.6–10.4)
CHLORIDE SERPL-SCNC: 96 MMOL/L (ref 98–110)
CHOLEST SERPL-MCNC: 168 MG/DL
CHOLEST/HDLC SERPL: 3 (CALC)
CO2 SERPL-SCNC: 28 MMOL/L (ref 20–32)
CREAT SERPL-MCNC: 0.85 MG/DL (ref 0.6–0.93)
EOSINOPHIL # BLD AUTO: 192 CELLS/UL (ref 15–500)
EOSINOPHIL NFR BLD AUTO: 3 %
ERYTHROCYTE [DISTWIDTH] IN BLOOD BY AUTOMATED COUNT: 12.7 % (ref 11–15)
GLOBULIN SER CALC-MCNC: 2.4 G/DL (CALC) (ref 1.9–3.7)
GLUCOSE SERPL-MCNC: 98 MG/DL (ref 65–99)
HCT VFR BLD AUTO: 40.6 % (ref 35–45)
HDLC SERPL-MCNC: 56 MG/DL
HGB BLD-MCNC: 13.7 G/DL (ref 11.7–15.5)
LDLC SERPL CALC-MCNC: 85 MG/DL (CALC)
LYMPHOCYTES # BLD AUTO: 1056 CELLS/UL (ref 850–3900)
LYMPHOCYTES NFR BLD AUTO: 16.5 %
MCH RBC QN AUTO: 31.6 PG (ref 27–33)
MCHC RBC AUTO-ENTMCNC: 33.7 G/DL (ref 32–36)
MCV RBC AUTO: 93.8 FL (ref 80–100)
MONOCYTES # BLD AUTO: 506 CELLS/UL (ref 200–950)
MONOCYTES NFR BLD AUTO: 7.9 %
NEUTROPHILS # BLD AUTO: 4614 CELLS/UL (ref 1500–7800)
NEUTROPHILS NFR BLD AUTO: 72.1 %
NONHDLC SERPL-MCNC: 112 MG/DL (CALC)
PLATELET # BLD AUTO: 194 THOUSAND/UL (ref 140–400)
PMV BLD REES-ECKER: 9.3 FL (ref 7.5–12.5)
POTASSIUM SERPL-SCNC: 4.1 MMOL/L (ref 3.5–5.3)
PROT SERPL-MCNC: 6.4 G/DL (ref 6.1–8.1)
QUEST EGFR NON-AFR. AMERICAN: 67 ML/MIN/1.73M2
RBC # BLD AUTO: 4.33 MILLION/UL (ref 3.8–5.1)
SODIUM SERPL-SCNC: 135 MMOL/L (ref 135–146)
TRIGL SERPL-MCNC: 169 MG/DL
WBC # BLD AUTO: 6.4 THOUSAND/UL (ref 3.8–10.8)

## 2019-10-08 PROCEDURE — G0008 ADMIN INFLUENZA VIRUS VAC: HCPCS | Performed by: INTERNAL MEDICINE

## 2019-10-08 PROCEDURE — 99214 OFFICE O/P EST MOD 30 MIN: CPT | Mod: 25 | Performed by: INTERNAL MEDICINE

## 2019-10-08 PROCEDURE — 90653 IIV ADJUVANT VACCINE IM: CPT | Performed by: INTERNAL MEDICINE

## 2019-10-08 PROCEDURE — G0439 PPPS, SUBSEQ VISIT: HCPCS | Performed by: INTERNAL MEDICINE

## 2019-10-08 RX ORDER — SIMVASTATIN 20 MG/1
20 TABLET, FILM COATED ORAL
Qty: 90 TABLET | Refills: 2 | Status: SHIPPED | OUTPATIENT
Start: 2019-10-08 | End: 2020-05-26

## 2019-10-08 RX ORDER — GABAPENTIN 300 MG/1
600 CAPSULE ORAL NIGHTLY
COMMUNITY
Start: 2019-09-18 | End: 2019-12-09 | Stop reason: SDUPTHER

## 2019-10-08 RX ORDER — CLOTRIMAZOLE AND BETAMETHASONE DIPROPIONATE 10; .64 MG/G; MG/G
CREAM TOPICAL 2 TIMES DAILY
Qty: 45 G | Refills: 1 | Status: SHIPPED | OUTPATIENT
Start: 2019-10-08 | End: 2019-11-07

## 2019-10-08 RX ORDER — TRIAMTERENE/HYDROCHLOROTHIAZID 37.5-25 MG
1 TABLET ORAL
Qty: 90 TABLET | Refills: 2 | Status: SHIPPED | OUTPATIENT
Start: 2019-10-08 | End: 2020-05-26

## 2019-10-08 RX ORDER — BISOPROLOL FUMARATE 5 MG/1
5 TABLET, FILM COATED ORAL
Qty: 90 TABLET | Refills: 2 | Status: SHIPPED | OUTPATIENT
Start: 2019-10-08 | End: 2020-05-26

## 2019-10-08 ASSESSMENT — ENCOUNTER SYMPTOMS
CHILLS: 0
SHORTNESS OF BREATH: 0
FEVER: 0
WHEEZING: 0
MYALGIAS: 1
NUMBNESS: 1

## 2019-10-08 ASSESSMENT — MINI COG
TOTAL SCORE: 3
COMPLETED: YES

## 2019-10-08 NOTE — PROGRESS NOTES
"Subjective      Patient ID: Concepcion Romero is a 74 y.o. female.  1945      HPI    The patient presents for Medicare Wellness Visit, see separate note. She noticed rash in her groin starting four days ago. She tried cortisone cream which helped somewhat. Rash is draining some clear fluid. Not itchy or painful. No fever or chills. Did finish antibiotic for UTI five days prior to rash starting.    She notes persistent issues with neuropathic pain but had severe reaction to Cymbalta, now back on gabapentin and will follow up with neurology later this month.       Review of Systems   Constitutional: Negative for chills and fever.   Respiratory: Negative for shortness of breath and wheezing.    Cardiovascular: Negative for chest pain.   Musculoskeletal: Positive for myalgias.   Skin: Positive for rash.   Neurological: Positive for numbness.       Objective     Vitals:    10/08/19 1251   BP: 110/80   BP Location: Right upper arm   Patient Position: Sitting   Pulse: 78   Resp: 16   Temp: 36.6 °C (97.8 °F)   TempSrc: Oral   SpO2: 98%   Weight: 93.1 kg (205 lb 3.2 oz)   Height: 1.626 m (5' 4\")     Body mass index is 35.22 kg/m².     Past Medical History:   Diagnosis Date   • Arthritis    • Basal cell carcinoma     On nose   • Colon cancer (CMS/HCC)     Diagnosed at age 72   • HBP (high blood pressure)    • High cholesterol    • Insomnia    • Melanoma (CMS/HCC)     ON back   • Neuropathy    • Screening for breast cancer     Annual mammograms     Past Surgical History:   Procedure Laterality Date   • BOWEL RESECTION     • CATARACT EXTRACTION     • COLONOSCOPY      2 total colonoscopy    • MOHS SURGERY  03/2018   • TUBAL LIGATION       Family History   Problem Relation Age of Onset   • Heart disease Biological Mother    • Heart disease Biological Father    • Heart disease Brother      Social History     Social History   • Marital status:      Spouse name: N/A   • Number of children: N/A   • Years of education: N/A "     Occupational History   • Not on file.     Social History Main Topics   • Smoking status: Former Smoker     Packs/day: 1.00     Quit date: 1985   • Smokeless tobacco: Never Used      Comment: from age [age]   • Alcohol use Yes      Comment: 2 glases wine on fri and sat, 1 glass on sunday   • Drug use: No   • Sexual activity: Not on file     Other Topics Concern   • Not on file     Social History Narrative   • No narrative on file       Current Outpatient Prescriptions:   •  B-complex with vitamin C tablet, Take 1 tablet by mouth daily., Disp: , Rfl:   •  bisoprolol (ZEBETA) 5 mg tablet, Take 1 tablet (5 mg total) by mouth once daily., Disp: 90 tablet, Rfl: 2  •  fluticasone propionate (FLONASE) 50 mcg/actuation nasal spray, Administer 1 spray into each nostril daily., Disp: 1 Bottle, Rfl: 5  •  gabapentin (NEURONTIN) 300 mg capsule, , Disp: , Rfl:   •  ibuprofen (MOTRIN) 200 mg tablet, Take 400 mg by mouth 2 (two) times a day. 2 tabs in am and 2 tabs in pm , Disp: , Rfl:   •  multivit-min-FA-lycopen-lutein tablet, Take 1 tablet by mouth daily., Disp: , Rfl:   •  simvastatin (ZOCOR) 20 mg tablet, Take 1 tablet (20 mg total) by mouth once daily., Disp: 90 tablet, Rfl: 2  •  triamterene-hydrochlorothiazide (MAXZIDE-25) 37.5-25 mg per tablet, Take 1 tablet by mouth once daily., Disp: 90 tablet, Rfl: 2  •  zolpidem (AMBIEN) 5 mg tablet, Take 1 tablet (5 mg total) by mouth nightly as needed for sleep., Disp: 30 tablet, Rfl: 2  Allergies   Allergen Reactions   • Ciprofloxacin Itching         Physical Exam   Constitutional: She is oriented to person, place, and time. She appears well-developed and well-nourished. No distress.   HENT:   Right Ear: External ear normal.   Left Ear: External ear normal.   Cardiovascular: Normal rate, regular rhythm and normal heart sounds.  Exam reveals no gallop and no friction rub.    No murmur heard.  Pulmonary/Chest: Effort normal and breath sounds normal. No respiratory distress. She  has no wheezes. She has no rales.   Neurological: She is alert and oriented to person, place, and time. Coordination normal.   Skin: Skin is warm.        Psychiatric: She has a normal mood and affect. Her behavior is normal. Judgment and thought content normal.       Assessment/Plan   Diagnoses and all orders for this visit:    Medicare annual wellness visit, subsequent (Primary): See separate note.    Idiopathic peripheral neuropathy: Will continue gabapentin until she sees neurology.    Essential hypertension: Controlled, continue current management.    Mixed hyperlipidemia: continue statin.    Visit for screening mammogram  -     BI SCREENING MAMMOGRAM BILATERAL; Future    Other orders  -     simvastatin (ZOCOR) 20 mg tablet; Take 1 tablet (20 mg total) by mouth once daily.  -     triamterene-hydrochlorothiazide (MAXZIDE-25) 37.5-25 mg per tablet; Take 1 tablet by mouth once daily.  -     bisoprolol (ZEBETA) 5 mg tablet; Take 1 tablet (5 mg total) by mouth once daily.  -     Influenza vaccine 65 and older IM preservative free (FluAd)  -     clotrimazole-betamethasone (LOTRISONE) 1-0.05 % cream; Apply topically 2 (two) times a day.

## 2019-10-08 NOTE — PROGRESS NOTES
Subjective     Concepcion Romero is a 74 y.o. female who presents for a subsequent annual wellness visit.     Comprehensive Medical and Social History  Patient Active Problem List   Diagnosis   • Malignant neoplasm of hepatic flexure (CMS/HCC)   • Pain   • Numbness   • Weakness   • Ataxia   • Idiopathic peripheral neuropathy   • Chronic coronary artery disease   • Cardiomyopathy (CMS/HCC)   • HTN (hypertension)   • Hyperlipidemia   • Seasonal allergies   • History of colon cancer     Past Medical History:   Diagnosis Date   • Arthritis    • Basal cell carcinoma     On nose   • Colon cancer (CMS/HCC)     Diagnosed at age 72   • HBP (high blood pressure)    • High cholesterol    • Insomnia    • Melanoma (CMS/HCC)     ON back   • Neuropathy    • Screening for breast cancer     Annual mammograms     Past Surgical History:   Procedure Laterality Date   • BOWEL RESECTION     • CATARACT EXTRACTION     • COLONOSCOPY      2 total colonoscopy    • MOHS SURGERY  03/2018   • TUBAL LIGATION       Allergies   Allergen Reactions   • Ciprofloxacin Itching     Current Outpatient Prescriptions   Medication Sig Dispense Refill   • B-complex with vitamin C tablet Take 1 tablet by mouth daily.     • bisoprolol (ZEBETA) 5 mg tablet TAKE 1 TABLET DAILY 90 tablet 2   • fluticasone propionate (FLONASE) 50 mcg/actuation nasal spray Administer 1 spray into each nostril daily. 1 Bottle 5   • gabapentin (NEURONTIN) 300 mg capsule      • ibuprofen (MOTRIN) 200 mg tablet Take 400 mg by mouth 2 (two) times a day. 2 tabs in am and 2 tabs in pm      • multivit-min-FA-lycopen-lutein tablet Take 1 tablet by mouth daily.     • simvastatin (ZOCOR) 20 mg tablet TAKE 1 TABLET DAILY 90 tablet 2   • triamterene-hydrochlorothiazide (MAXZIDE-25) 37.5-25 mg per tablet TAKE 1 TABLET DAILY 90 tablet 2   • zolpidem (AMBIEN) 5 mg tablet Take 1 tablet (5 mg total) by mouth nightly as needed for sleep. 30 tablet 2     No current facility-administered medications  "for this visit.      Social History     Social History   • Marital status:      Spouse name: N/A   • Number of children: N/A   • Years of education: N/A     Social History Main Topics   • Smoking status: Former Smoker     Packs/day: 1.00     Quit date: 1985   • Smokeless tobacco: Never Used      Comment: from age [age]   • Alcohol use Yes      Comment: 2 glases wine on fri and sat, 1 glass on sunday   • Drug use: No   • Sexual activity: Not on file     Other Topics Concern   • Not on file     Social History Narrative   • No narrative on file       Objective   Vitals  Vitals:    10/08/19 1251   BP: 110/80   BP Location: Right upper arm   Patient Position: Sitting   Pulse: 78   Resp: 16   Temp: 36.6 °C (97.8 °F)   TempSrc: Oral   SpO2: 98%   Weight: 93.1 kg (205 lb 3.2 oz)   Height: 1.626 m (5' 4\")     Body mass index is 35.22 kg/m².    Advanced Care Plan  Does patient have advance directive?: Yes                                     PHQ  Over the Past 2 Weeks, Have You Felt Down, Depressed or Hopeless?: no  Over the Past 2 Weeks, Have You Felt Little Interest or Pleasure In Doing Things?: no                                          Mini Cog  Completed: Yes  Score: 3  Result: Positive    Get Up and Go  Result: Pass            STEADI Falls Risk  One or more falls in the last year: No           Has trouble stepping up onto a curb: No   Advised to use a cane or walker to get around safely: No   Often has to rush to the toilet: No   Feels unsteady when walking: No   Has lost some feeling in feet: No   Often feels sad or depressed: No   Steadies self on furniture while walking at home: No   Takes medication that makes him/her feel lightheaded or more tired than usual: No   Worried about falling: No   Takes medicine to sleep or improve mood: No   Needs to push with hands when rising from a chair: No   Falls screen completed: Yes       Hearing and Vision Screening  No exam data present  See HRA for relevant hearing " screening response.      Assessment/Plan   Diagnoses and all orders for this visit:    Medicare annual wellness visit, subsequent (Primary): Completed today, see note above. Flu shot given today. Pneumonia shots up to date. Recommend shingrix.      See Patient Instructions (the written plan) which was given to the patient for PPPS and health risk factors with interventions.

## 2019-10-08 NOTE — PATIENT INSTRUCTIONS
Your Personalized Prevention Plan Services (PPPS)    Preventive Services Checklist (Assumes Average Risk Unless Otherwise Noted):    Health Maintenance Topics with due status: Overdue       Topic Date Due    DTaP, Tdap, and Td Vaccines 06/12/1962    Zoster Vaccine 06/12/1993    Mammogram 07/12/2019    Influenza Vaccine 08/01/2019    Medicare Annual Wellness Visit 09/26/2019     Health Maintenance Topics with due status: Not Due       Topic Last Completion Date    DEXA Scan 11/26/2018    Colonoscopy 01/08/2019    Annual Falls Risk Screening 10/08/2019     Health Maintenance Topics with due status: Completed / Addressed / Aged Out       Topic Last Completion Date    Pneumococcal 06/08/2017    Pneumococcal (65 years and older) 06/08/2017    Meningococcal ACWY Aged Out    Varicella Vaccines Aged Out    HIB Vaccines Aged Out    IPV Vaccines Aged Out    HPV Vaccines Aged Out       You May Be Eligible for These Additional Preventive Services   (Assumes Average Risk Unless Otherwise Noted)  Diabetes Screening Any 1 risk factor: hypertension, dyslipidemia, obesity, high glucose; or Any 2 risk factors: >=66yo, overweight, family history diabetes (covered every 6 months)   Hepatitis C Screening Any 1 risk factor: 1) blood transfusion before 1992,   2) current or past injection drug use (annually for high risk; if born between 7435-8710, see above for status).   Vaccine: Hepatitis B As necessary if at-risk: hemophilia, ESRD, diabetes, living with individual infected with hep B, healthcare worker with frequent contact with blood/bodily fluids (series covered once)   Sexually Transmitted Diseases (STDs) As necessary chlamydia, gonorrhea, syphilis, hepatitis B (covered annually)  HIV if any 1 risk factor present: 1) <14yo or >66yo and at increased risk or 2) 15-66yo and ask for it (covered annually)   Lung Cancer Screening Low dose chest CT if all 3 risk factors: 1) 55-76yo, 2) smoker or quit within last  15y, 3) >=30 pack years (covered annually).  No results found for this or any previous visit.     Cholesterol Screening Both risk factors: 1) >=19yo and 2)  increased risk coronary artery disease (covered every 5 years).     Breast Cancer Screening Covered once 35-38yo, annually >=41yo (if >=49yo, see above for status).         Health Risk Factors with Personalized Education:  ----------------------------------------------------------------------------------------------------------------------  Controlling Your Blood Pressure  · Maintain a normal weight (body mass index between 18.5 and 24.9).  · Eat more fruit, vegetables and low-fat dairy.  · Eat less saturated fat and total fat.  · Lower your sodium (salt) intake.  Try to stay under 1500 mg per day, but if you cannot get your intake to be that low, at least lower it by 1000 mg.  · Stay active.  Try to get at least 90 to 150 minutes of exercise per week.  Try brisk walking, swimming, bicycling or dancing.  · Limit alcohol intake.  When you do consume alcohol, drink no more than 1 drink per day.  · If you have been prescribed medication, take it regularly and exactly as prescribed.  Let your PCP know if you have any problems or questions about your medication.  · Check your blood pressure at home or at the store.  Write down your readings and share them with your PCP  ----------------------------------------------------------------------------------------------------------------------  Controlling Your Cholesterol  · Reduce the amount of saturated and trans fat in your diet.  Limit intake of red meat.  Consume only low-fat or non-fat/skim dairy.  Limit fried food.  Cook with vegetable oils.  · Reduce your intake of sugary foods, sugary drinks and alcohol.  · Eat a diet high in fruit, vegetables and whole grains.  · Get protein from fish, poultry and a small portion of nuts.  · Stay active.  Try to get at least 90 to 150 minutes of exercise per week.  Try brisk  walking, swimming, bicycling or dancing.  · Maintain a healthy weight by balancing your diet and exercise.  · If you have been prescribed medication, take it regularly and exactly as prescribed. Let your PCP know if you have any problems or questions about your medication.  · It’s important to know your cholesterol numbers.  When recommended by your PCP, get the cholesterol blood test.  ----------------------------------------------------------------------------------------------------------------------  Maintaining Strong Bones  · Try to get at least 90 to 150 minutes of weight-bearing exercise per week.  · Ensure intake of at least 1200mg of calcium per day.  Eat foods high in calcium like milk and other dairy, green vegetables, fruit, canned fish with soft and edible bones, nuts, calcium-set tofu.  Some foods are calcium-fortified, like bread, cereal, fruit juices and mineral water.  · Help your body make vitamin D by getting 10-15 minutes per day of sunlight.    · Ensure intake of at least 600IU of vitamin D per day.  Eat foods high in vitamin D like oily fish (salmon, sardines, mackerel) and eggs.  Some foods are fortified with vitamin D, like dairy and cereals.  · Avoid high amounts of caffeine and salt, since they can cause the body to loose calcium.  · Limit alcohol intake, since it is associated with weaker bones and is associated with falls and fractures.  · Limit intake of fizzy drinks.  ----------------------------------------------------------------------------------------------------------------------  Reducing Your Risk of Falls  · Tell your PCP if any of your medications make you feel tired, dizzy, lightheaded or off-balance.  · Maintain coordination, flexibility and balance by ensuring regular physical activity.  · Limit alcohol intake to 1 drink per day.  Consider avoiding all alcohol intake.  · Ensure good vision.  Visit an ophthalmologist or optometrist regularly for vision screening or to make  sure your glasses / contact lens prescription is correct.  If you need glasses or contacts, wear them.  When you get new glasses or contacts, take time to get used to them.  Do not wear sunglasses or tinted lenses when indoors.  · Ensure good hearing.  Have your hearing checked if you are having trouble hearing, or family and friends think you cannot hear them.  If you need a hearing aid, be sure it fits well and wear it.  · Get enough rest.  Ensure about 7-9 hours of sleep every day.  · Get up slowly from your bed or chairs.  Do not start walking until you are sure you feel steady.  · Wear non-skid, rubber-soled, low-heeled shoes.  Do not walk in socks, or in shoes and slippers with smooth soles.  · If your PCP or therapist recommends using a cane or walker, use it regularly.  · Make your home safer.  Increase lighting throughout the house, especially at the top and bottom of stairs.  Ensure lighting is easily turned on when getting up in the middle of the night.  Make sure there are two secure rails on all stairs.  Install grab bars in the bathtub / shower and near the toilet.  Consider using a shower chair and / or a hand-held shower.  · Spread sand or salt on icy surfaces.  Beware of wet surfaces, which can be icy.  · Tell your PCP if you have fallen.

## 2019-10-11 ENCOUNTER — TRANSCRIBE ORDERS (OUTPATIENT)
Dept: SCHEDULING | Age: 74
End: 2019-10-11

## 2019-10-11 ENCOUNTER — TELEPHONE (OUTPATIENT)
Dept: TRANSFER UNIT | Age: 74
End: 2019-10-11

## 2019-10-11 DIAGNOSIS — C18.0 MALIGNANT NEOPLASM OF CECUM (CMS/HCC): Primary | ICD-10-CM

## 2019-10-22 ENCOUNTER — HOSPITAL ENCOUNTER (OUTPATIENT)
Dept: RADIOLOGY | Age: 74
Discharge: HOME | End: 2019-10-22
Attending: INTERNAL MEDICINE
Payer: MEDICARE

## 2019-10-22 DIAGNOSIS — Z12.31 VISIT FOR SCREENING MAMMOGRAM: ICD-10-CM

## 2019-10-22 DIAGNOSIS — C18.0 MALIGNANT NEOPLASM OF CECUM (CMS/HCC): ICD-10-CM

## 2019-10-22 PROCEDURE — 77067 SCR MAMMO BI INCL CAD: CPT

## 2019-10-22 PROCEDURE — 63600105 HC IODINE BASED CONTRAST: Performed by: INTERNAL MEDICINE

## 2019-10-22 PROCEDURE — 74177 CT ABD & PELVIS W/CONTRAST: CPT

## 2019-10-22 RX ADMIN — IOHEXOL 145 ML: 350 INJECTION, SOLUTION INTRAVENOUS at 13:52

## 2019-10-24 ENCOUNTER — OFFICE VISIT (OUTPATIENT)
Dept: NEUROLOGY | Facility: CLINIC | Age: 74
End: 2019-10-24
Payer: MEDICARE

## 2019-10-24 VITALS — DIASTOLIC BLOOD PRESSURE: 70 MMHG | RESPIRATION RATE: 14 BRPM | HEART RATE: 63 BPM | SYSTOLIC BLOOD PRESSURE: 114 MMHG

## 2019-10-24 DIAGNOSIS — G60.9 IDIOPATHIC PERIPHERAL NEUROPATHY: Primary | ICD-10-CM

## 2019-10-24 PROCEDURE — 99213 OFFICE O/P EST LOW 20 MIN: CPT | Performed by: PSYCHIATRY & NEUROLOGY

## 2019-10-24 NOTE — PROGRESS NOTES
"Concepcion Romero is a 74 y.o. female  10/24/2019  Ban Salomon MD    Neurology Follow Up Note    Subjective     Concepcion Romero is a 74 y.o. female who is being evaluated  for polyneuropathy.  I previously saw the patient a little over a year ago.  At that time she was on gabapentin.  We have made adjustments since her last visit.    As you know in the past the patient had difficulty tolerating gabapentin.  We tried switching her to Cymbalta which she was unable to tolerate due to side effects.  She is now taking gabapentin 600 mg nightly.  She is doing well on this dose and has no interest in increasing the dose or trying another medication.    The patient reported that some days she is good and some days she is bad.  For the most part she has numbness and tingling in her hands and feet.  Approximately once a week she has painful dysesthesias in the feet greater than hands.  Probably twice a month she has severe pain in the evening.  She reported that \"I do not know what sets it off\"; she has been unable to identify a trigger for her pain or factors that make it better or worse.  Sometimes late night she will take an extra 300 mg of gabapentin which works as a great sleeping pill which helps her fall back to sleep.    The patient continues to have abnormal sensation mostly in the distal half of both feet right equal to left.  She has nonradiating neck and back pain.  She denied focal weakness.  She does have imbalance however takes care while walking and uses a cane.  She did have a recent fall however fortunately did not injure herself.  She is scared to try medication such as Lyrica because a cousin of hers who has fibromyalgia and is bedbound did not tolerate the medication.  Comprehensive review of systems was otherwise unremarkable.  There were no symptoms to suggest increased intracranial pressure, meningitis or systemic illness.  She has had no autonomic dysfunction.    Review of Systems  Constitutional: " negative  Eyes: negative  Ears, nose, mouth, throat, and face: negative  Respiratory: negative  Cardiovascular: negative  Gastrointestinal: negative  Genitourinary:negative  Integument/breast: negative  Hematologic/lymphatic: negative  Musculoskeletal:negative  Neurological: negative  Behavioral/Psych: negative  Endocrine: negative  Allergic/Immunologic: negative    Current Outpatient Medications   Medication Sig Dispense Refill   • cannabidiol (CBD) oil 1 each See admin instr. Topical cream     • B-complex with vitamin C tablet Take 1 tablet by mouth daily.     • bisoprolol (ZEBETA) 5 mg tablet Take 1 tablet (5 mg total) by mouth once daily. 90 tablet 2   • clotrimazole-betamethasone (LOTRISONE) 1-0.05 % cream Apply topically 2 (two) times a day. 45 g 1   • fluticasone propionate (FLONASE) 50 mcg/actuation nasal spray Administer 1 spray into each nostril daily. 1 Bottle 5   • gabapentin (NEURONTIN) 300 mg capsule Take 600 mg by mouth nightly.       • ibuprofen (MOTRIN) 200 mg tablet Take 400 mg by mouth 2 (two) times a day. 2 tabs in am and 2 tabs in pm      • multivit-min-FA-lycopen-lutein tablet Take 1 tablet by mouth daily.     • simvastatin (ZOCOR) 20 mg tablet Take 1 tablet (20 mg total) by mouth once daily. 90 tablet 2   • triamterene-hydrochlorothiazide (MAXZIDE-25) 37.5-25 mg per tablet Take 1 tablet by mouth once daily. 90 tablet 2   • zolpidem (AMBIEN) 5 mg tablet Take 1 tablet (5 mg total) by mouth nightly as needed for sleep. 30 tablet 2     No current facility-administered medications for this visit.        PMH/SH/FH : Unchanged since previous visit.    Objective     Physical Exam  Visit Vitals  /70   Pulse 63   Resp 14       General Appearance:  Alert, no distress, appears stated age               Neurologic Exam:  Alert and oriented. Attention, concentration, memory, language, visual spatial orientation, executive function is normal.    Pupils equal round and reactive to light. Extraocular  movement full with normal pursuit + saccades. No nystagmus noted.   Facial strength and sensation is normal. Hearing normal.  The tongue and uvula were midline. No dysarthria or dysphagia.   Strength was 5/5 in bulbar, axial + extremity muscles.There was normal bulk and tone with no abnormal movements.   Small fiber sensory modalities were reduced in the feet. There was no dysmetria or cerebellar signs.   The gait was slow and wide-based.  Reflexes were plus in the arms and absent in the legs.     Data Reviewed:      Problem List Items Addressed This Visit        Nervous    Idiopathic peripheral neuropathy - Primary    Current Assessment & Plan     The patient is likely suffering from a combination of a small fiber polyneuropathy as well as lumbosacral radiculopathy.  At this point I do not think any diagnostics are indicated.  From a treatment perspective the patient would like to stay on her gabapentin 600 mg nightly.  She was too worried about potential side effects to consider initiating treatment with another neuropathic pain medication.    I did arrange for the patient to attend physical therapy.  We will focus not just on strength and conditioning but also her gait and balance.  In the future, additional diagnostics and treatments will depend on her clinical course.         Relevant Orders    Ambulatory referral to Physical Therapy          It was a real pleasure treating Concepcion Romero today, thank you for allowing me to participate in the medical care. If you have any questions, please call me at any time. Concepcion Romero will follow up with me in the coming weeks to months and keep me updated by telephone. Concepcion Romero knows to notify me immediately if there is any change in the condition or if there are any new symptoms of transient or static neurologic dysfunction.    Mau Montoya MD

## 2019-10-24 NOTE — LETTER
"October 24, 2019     Ban Salomon MD  [address]  [address]  [facility] [address]    Patient: Concepcion Romero  YOB: 1945  Date of Visit: 10/24/2019      Dear Dr. Salomon:    Thank you for referring Concepcion Romero to me for evaluation. Below are my notes for this consultation.    If you have questions, please do not hesitate to call me. I look forward to following your patient along with you.         Sincerely,        Mau Montoya MD        CC: No Recipients  Mau Montoya MD  10/24/2019  4:22 PM  Signed  Concepcion Romero is a 74 y.o. female  10/24/2019  Ban Salomon MD    Neurology Follow Up Note    Subjective     Concepcion Romero is a 74 y.o. female who is being evaluated  for polyneuropathy.  I previously saw the patient a little over a year ago.  At that time she was on gabapentin.  We have made adjustments since her last visit.    As you know in the past the patient had difficulty tolerating gabapentin.  We tried switching her to Cymbalta which she was unable to tolerate due to side effects.  She is now taking gabapentin 600 mg nightly.  She is doing well on this dose and has no interest in increasing the dose or trying another medication.    The patient reported that some days she is good and some days she is bad.  For the most part she has numbness and tingling in her hands and feet.  Approximately once a week she has painful dysesthesias in the feet greater than hands.  Probably twice a month she has severe pain in the evening.  She reported that \"I do not know what sets it off\"; she has been unable to identify a trigger for her pain or factors that make it better or worse.  Sometimes late night she will take an extra 300 mg of gabapentin which works as a great sleeping pill which helps her fall back to sleep.    The patient continues to have abnormal sensation mostly in the distal half of both feet right equal to left.  She has nonradiating neck and back pain.  She denied focal " weakness.  She does have imbalance however takes care while walking and uses a cane.  She did have a recent fall however fortunately did not injure herself.  She is scared to try medication such as Lyrica because a cousin of hers who has fibromyalgia and is bedbound did not tolerate the medication.  Comprehensive review of systems was otherwise unremarkable.  There were no symptoms to suggest increased intracranial pressure, meningitis or systemic illness.  She has had no autonomic dysfunction.    Review of Systems  Constitutional: negative  Eyes: negative  Ears, nose, mouth, throat, and face: negative  Respiratory: negative  Cardiovascular: negative  Gastrointestinal: negative  Genitourinary:negative  Integument/breast: negative  Hematologic/lymphatic: negative  Musculoskeletal:negative  Neurological: negative  Behavioral/Psych: negative  Endocrine: negative  Allergic/Immunologic: negative    Current Outpatient Medications   Medication Sig Dispense Refill   • cannabidiol (CBD) oil 1 each See admin instr. Topical cream     • B-complex with vitamin C tablet Take 1 tablet by mouth daily.     • bisoprolol (ZEBETA) 5 mg tablet Take 1 tablet (5 mg total) by mouth once daily. 90 tablet 2   • clotrimazole-betamethasone (LOTRISONE) 1-0.05 % cream Apply topically 2 (two) times a day. 45 g 1   • fluticasone propionate (FLONASE) 50 mcg/actuation nasal spray Administer 1 spray into each nostril daily. 1 Bottle 5   • gabapentin (NEURONTIN) 300 mg capsule Take 600 mg by mouth nightly.       • ibuprofen (MOTRIN) 200 mg tablet Take 400 mg by mouth 2 (two) times a day. 2 tabs in am and 2 tabs in pm      • multivit-min-FA-lycopen-lutein tablet Take 1 tablet by mouth daily.     • simvastatin (ZOCOR) 20 mg tablet Take 1 tablet (20 mg total) by mouth once daily. 90 tablet 2   • triamterene-hydrochlorothiazide (MAXZIDE-25) 37.5-25 mg per tablet Take 1 tablet by mouth once daily. 90 tablet 2   • zolpidem (AMBIEN) 5 mg tablet Take 1 tablet  (5 mg total) by mouth nightly as needed for sleep. 30 tablet 2     No current facility-administered medications for this visit.        PMH/SH/FH : Unchanged since previous visit.    Objective     Physical Exam  Visit Vitals  /70   Pulse 63   Resp 14       General Appearance:  Alert, no distress, appears stated age               Neurologic Exam:  Alert and oriented. Attention, concentration, memory, language, visual spatial orientation, executive function is normal.    Pupils equal round and reactive to light. Extraocular movement full with normal pursuit + saccades. No nystagmus noted.   Facial strength and sensation is normal. Hearing normal.  The tongue and uvula were midline. No dysarthria or dysphagia.   Strength was 5/5 in bulbar, axial + extremity muscles.There was normal bulk and tone with no abnormal movements.   Small fiber sensory modalities were reduced in the feet. There was no dysmetria or cerebellar signs.   The gait was slow and wide-based.  Reflexes were plus in the arms and absent in the legs.     Data Reviewed:      Problem List Items Addressed This Visit        Nervous    Idiopathic peripheral neuropathy - Primary    Current Assessment & Plan     The patient is likely suffering from a combination of a small fiber polyneuropathy as well as lumbosacral radiculopathy.  At this point I do not think any diagnostics are indicated.  From a treatment perspective the patient would like to stay on her gabapentin 600 mg nightly.  She was too worried about potential side effects to consider initiating treatment with another neuropathic pain medication.    I did arrange for the patient to attend physical therapy.  We will focus not just on strength and conditioning but also her gait and balance.  In the future, additional diagnostics and treatments will depend on her clinical course.         Relevant Orders    Ambulatory referral to Physical Therapy          It was a real pleasure treating Concepcion JONES  Heather today, thank you for allowing me to participate in the medical care. If you have any questions, please call me at any time. Concepcion Romero will follow up with me in the coming weeks to months and keep me updated by telephone. Concepcion Romero knows to notify me immediately if there is any change in the condition or if there are any new symptoms of transient or static neurologic dysfunction.    Mau Montoya MD

## 2019-12-09 ENCOUNTER — TELEPHONE (OUTPATIENT)
Dept: INTERNAL MEDICINE | Facility: CLINIC | Age: 74
End: 2019-12-09

## 2019-12-09 RX ORDER — GABAPENTIN 300 MG/1
600 CAPSULE ORAL NIGHTLY
Qty: 180 CAPSULE | Refills: 1 | Status: SHIPPED | OUTPATIENT
Start: 2019-12-09 | End: 2020-04-30 | Stop reason: SDUPTHER

## 2019-12-09 NOTE — TELEPHONE ENCOUNTER
Patient called and needs a refill on her Gabapentin sent to [facility]. Please advise

## 2019-12-11 DIAGNOSIS — R52 PAIN: ICD-10-CM

## 2019-12-11 DIAGNOSIS — R53.1 WEAKNESS: ICD-10-CM

## 2019-12-11 DIAGNOSIS — R27.0 ATAXIA: ICD-10-CM

## 2019-12-11 DIAGNOSIS — G60.9 IDIOPATHIC PERIPHERAL NEUROPATHY: Primary | ICD-10-CM

## 2019-12-11 DIAGNOSIS — R20.0 NUMBNESS: ICD-10-CM

## 2020-01-14 DIAGNOSIS — R20.0 NUMBNESS: ICD-10-CM

## 2020-01-14 DIAGNOSIS — R53.1 WEAKNESS: ICD-10-CM

## 2020-01-14 DIAGNOSIS — R52 PAIN: ICD-10-CM

## 2020-01-14 DIAGNOSIS — R27.0 ATAXIA: Primary | ICD-10-CM

## 2020-01-14 DIAGNOSIS — G60.9 IDIOPATHIC PERIPHERAL NEUROPATHY: ICD-10-CM

## 2020-01-20 ENCOUNTER — LAB REQUISITION (OUTPATIENT)
Dept: LAB | Facility: HOSPITAL | Age: 75
End: 2020-01-20
Attending: INTERNAL MEDICINE
Payer: MEDICARE

## 2020-01-20 DIAGNOSIS — C18.0 MALIGNANT NEOPLASM OF CECUM (CMS/HCC): ICD-10-CM

## 2020-01-20 DIAGNOSIS — D50.9 IRON DEFICIENCY ANEMIA, UNSPECIFIED: ICD-10-CM

## 2020-01-20 LAB
BASOPHILS # BLD: 0.03 K/UL (ref 0.01–0.1)
BASOPHILS NFR BLD: 0.5 %
CEA SERPL-MCNC: 1.5 NG/ML
DIFFERENTIAL METHOD BLD: ABNORMAL
EOSINOPHIL # BLD: 0.09 K/UL (ref 0.04–0.36)
EOSINOPHIL NFR BLD: 1.5 %
ERYTHROCYTE [DISTWIDTH] IN BLOOD BY AUTOMATED COUNT: 11.7 % (ref 11.7–14.4)
HCT VFR BLDCO AUTO: 41.8 %
HGB BLD-MCNC: 14.4 G/DL (ref 11.8–15.7)
IMM GRANULOCYTES # BLD AUTO: 0.01 K/UL (ref 0–0.08)
IMM GRANULOCYTES NFR BLD AUTO: 0.2 %
LYMPHOCYTES # BLD: 1.46 K/UL (ref 1.2–3.5)
LYMPHOCYTES NFR BLD: 23.6 %
MCH RBC QN AUTO: 31.8 PG (ref 28–33.2)
MCHC RBC AUTO-ENTMCNC: 34.4 G/DL (ref 32.2–35.5)
MCV RBC AUTO: 92.3 FL (ref 83–98)
MONOCYTES # BLD: 0.57 K/UL (ref 0.28–0.8)
MONOCYTES NFR BLD: 9.2 %
NEUTROPHILS # BLD: 4.03 K/UL (ref 1.7–7)
NEUTS SEG NFR BLD: 65 %
NRBC BLD-RTO: 0 %
PDW BLD AUTO: 8.7 FL (ref 9.4–12.3)
PLATELET # BLD AUTO: 174 K/UL
RBC # BLD AUTO: 4.53 M/UL (ref 3.93–5.22)
WBC # BLD AUTO: 6.19 K/UL

## 2020-01-20 PROCEDURE — 82378 CARCINOEMBRYONIC ANTIGEN: CPT | Performed by: INTERNAL MEDICINE

## 2020-01-20 PROCEDURE — 85025 COMPLETE CBC W/AUTO DIFF WBC: CPT | Performed by: INTERNAL MEDICINE

## 2020-01-20 PROCEDURE — 36415 COLL VENOUS BLD VENIPUNCTURE: CPT | Performed by: INTERNAL MEDICINE

## 2020-01-29 ENCOUNTER — TRANSCRIBE ORDERS (OUTPATIENT)
Dept: SCHEDULING | Age: 75
End: 2020-01-29

## 2020-02-13 DIAGNOSIS — R27.0 ATAXIA: ICD-10-CM

## 2020-02-13 DIAGNOSIS — R20.0 NUMBNESS: ICD-10-CM

## 2020-02-13 DIAGNOSIS — R52 PAIN: ICD-10-CM

## 2020-02-13 DIAGNOSIS — R53.1 WEAKNESS: ICD-10-CM

## 2020-02-13 DIAGNOSIS — G60.9 IDIOPATHIC PERIPHERAL NEUROPATHY: Primary | ICD-10-CM

## 2020-02-14 ENCOUNTER — TELEPHONE (OUTPATIENT)
Dept: INTERNAL MEDICINE | Facility: CLINIC | Age: 75
End: 2020-02-14

## 2020-02-14 RX ORDER — ZOLPIDEM TARTRATE 5 MG/1
5 TABLET ORAL NIGHTLY PRN
Qty: 30 TABLET | Refills: 2 | Status: SHIPPED | OUTPATIENT
Start: 2020-02-14 | End: 2020-08-12

## 2020-04-30 ENCOUNTER — TELEPHONE (OUTPATIENT)
Dept: INTERNAL MEDICINE | Facility: CLINIC | Age: 75
End: 2020-04-30

## 2020-04-30 RX ORDER — GABAPENTIN 300 MG/1
600 CAPSULE ORAL NIGHTLY
Qty: 180 CAPSULE | Refills: 1 | Status: SHIPPED | OUTPATIENT
Start: 2020-04-30 | End: 2020-10-05

## 2020-04-30 NOTE — TELEPHONE ENCOUNTER
The gabapentin was refilled in December by Dr Wick for six months so she should still have enough through June?

## 2020-04-30 NOTE — TELEPHONE ENCOUNTER
Patient is sometimes taking a 3rd pill depending on how her feet feel. Stated that she got her refill on 2/24/2020 and it stated that there were no refills left so she is in needs of more.

## 2020-04-30 NOTE — TELEPHONE ENCOUNTER
Patient is requesting a refill of gabapentin (NEURONTIN) 300 mg capsule to be sent to  [facility] [place] [place] - [address] AT [facility]  [phone]

## 2020-05-14 ENCOUNTER — TRANSCRIBE ORDERS (OUTPATIENT)
Dept: SCHEDULING | Age: 75
End: 2020-05-14

## 2020-05-14 DIAGNOSIS — C18.0 MALIGNANT NEOPLASM OF CECUM (CMS/HCC): Primary | ICD-10-CM

## 2020-05-18 ENCOUNTER — TELEPHONE (OUTPATIENT)
Dept: INTERNAL MEDICINE | Facility: CLINIC | Age: 75
End: 2020-05-18

## 2020-05-18 NOTE — TELEPHONE ENCOUNTER
Pt has a lab slip to get labs done from Dr Lund-Oncologist. She would like to get fasting labs to get blood sugar checked. Can a slip be put in for this? She is going over around 10 a.m. to the lab in our building. Please advise. Pt's # [phone]

## 2020-05-18 NOTE — TELEPHONE ENCOUNTER
She last had fasting labs done in October 2019 which were normal, meaning she is not due to have these checked again until the fall. I certainly can order them but it is not technically indicated until the fall. However, if she feels strongly that she wants to have them done now for some reason, I can order them. If so, it would be helpful to know what her oncologist ordered so I don't duplicate it. Thanks!

## 2020-05-18 NOTE — TELEPHONE ENCOUNTER
The CMP would include a glucose so if she does that, it would include the fasting glucose. I don't feel that she needs to have a lipid panel done right now since she just had one done in the fall but she can let us know if she wants that done.

## 2020-05-18 NOTE — TELEPHONE ENCOUNTER
Spoke with pt and she is fine with not getting additional labs done. She just wanted to make sure about the fasting labs.

## 2020-05-19 ENCOUNTER — APPOINTMENT (OUTPATIENT)
Dept: LAB | Age: 75
End: 2020-05-19
Attending: INTERNAL MEDICINE
Payer: MEDICARE

## 2020-05-19 ENCOUNTER — TRANSCRIBE ORDERS (OUTPATIENT)
Dept: LAB | Age: 75
End: 2020-05-19

## 2020-05-19 DIAGNOSIS — D50.9 IRON DEFICIENCY ANEMIA, UNSPECIFIED: ICD-10-CM

## 2020-05-19 DIAGNOSIS — E78.2 MIXED HYPERLIPIDEMIA: ICD-10-CM

## 2020-05-19 DIAGNOSIS — C18.0 MALIGNANT NEOPLASM OF CECUM (CMS/HCC): ICD-10-CM

## 2020-05-19 DIAGNOSIS — D50.9 IRON DEFICIENCY ANEMIA, UNSPECIFIED: Primary | ICD-10-CM

## 2020-05-19 LAB
ALBUMIN SERPL-MCNC: 3.8 G/DL (ref 3.4–5)
ALP SERPL-CCNC: 56 IU/L (ref 35–126)
ALT SERPL-CCNC: 27 IU/L (ref 11–54)
ANION GAP SERPL CALC-SCNC: 8 MEQ/L (ref 3–15)
AST SERPL-CCNC: 26 IU/L (ref 15–41)
BASOPHILS # BLD: 0.04 K/UL (ref 0.01–0.1)
BASOPHILS NFR BLD: 0.7 %
BILIRUB SERPL-MCNC: 0.9 MG/DL (ref 0.3–1.2)
BUN SERPL-MCNC: 9 MG/DL (ref 8–20)
CALCIUM SERPL-MCNC: 9.3 MG/DL (ref 8.9–10.3)
CEA SERPL-MCNC: 1.3 NG/ML
CHLORIDE SERPL-SCNC: 95 MEQ/L (ref 98–109)
CHOLEST SERPL-MCNC: 189 MG/DL
CO2 SERPL-SCNC: 27 MEQ/L (ref 22–32)
CREAT SERPL-MCNC: 0.9 MG/DL (ref 0.6–1.1)
DIFFERENTIAL METHOD BLD: ABNORMAL
EOSINOPHIL # BLD: 0.13 K/UL (ref 0.04–0.36)
EOSINOPHIL NFR BLD: 2.3 %
ERYTHROCYTE [DISTWIDTH] IN BLOOD BY AUTOMATED COUNT: 12.2 % (ref 11.7–14.4)
GFR SERPL CREATININE-BSD FRML MDRD: >60 ML/MIN/1.73M*2
GLUCOSE SERPL-MCNC: 92 MG/DL (ref 70–99)
HCT VFR BLDCO AUTO: 43 % (ref 35–45)
HDLC SERPL-MCNC: 57 MG/DL
HDLC SERPL: 3.3 {RATIO}
HGB BLD-MCNC: 14.9 G/DL (ref 11.8–15.7)
IMM GRANULOCYTES # BLD AUTO: 0.01 K/UL (ref 0–0.08)
IMM GRANULOCYTES NFR BLD AUTO: 0.2 %
LDLC SERPL CALC-MCNC: 94 MG/DL
LYMPHOCYTES # BLD: 1.53 K/UL (ref 1.2–3.5)
LYMPHOCYTES NFR BLD: 27.6 %
MCH RBC QN AUTO: 32.3 PG (ref 28–33.2)
MCHC RBC AUTO-ENTMCNC: 34.7 G/DL (ref 32.2–35.5)
MCV RBC AUTO: 93.1 FL (ref 83–98)
MONOCYTES # BLD: 0.56 K/UL (ref 0.28–0.8)
MONOCYTES NFR BLD: 10.1 %
NEUTROPHILS # BLD: 3.28 K/UL (ref 1.7–7)
NEUTS SEG NFR BLD: 59.1 %
NONHDLC SERPL-MCNC: 132 MG/DL
NRBC BLD-RTO: 0 %
PDW BLD AUTO: 9.3 FL (ref 9.4–12.3)
PLATELET # BLD AUTO: 203 K/UL (ref 150–369)
POTASSIUM SERPL-SCNC: 3.8 MEQ/L (ref 3.6–5.1)
PROT SERPL-MCNC: 6.3 G/DL (ref 6–8.2)
RBC # BLD AUTO: 4.62 M/UL (ref 3.93–5.22)
SODIUM SERPL-SCNC: 130 MEQ/L (ref 136–144)
TRIGL SERPL-MCNC: 190 MG/DL (ref 30–149)
WBC # BLD AUTO: 5.55 K/UL (ref 3.8–10.5)

## 2020-05-19 PROCEDURE — 80053 COMPREHEN METABOLIC PANEL: CPT

## 2020-05-19 PROCEDURE — 82378 CARCINOEMBRYONIC ANTIGEN: CPT

## 2020-05-19 PROCEDURE — 80061 LIPID PANEL: CPT

## 2020-05-19 PROCEDURE — 85025 COMPLETE CBC W/AUTO DIFF WBC: CPT

## 2020-05-19 PROCEDURE — 36415 COLL VENOUS BLD VENIPUNCTURE: CPT

## 2020-05-26 RX ORDER — BISOPROLOL FUMARATE 5 MG/1
TABLET, FILM COATED ORAL
Qty: 90 TABLET | Refills: 2 | Status: SHIPPED | OUTPATIENT
Start: 2020-05-26

## 2020-05-26 RX ORDER — TRIAMTERENE/HYDROCHLOROTHIAZID 37.5-25 MG
TABLET ORAL
Qty: 90 TABLET | Refills: 2 | Status: SHIPPED | OUTPATIENT
Start: 2020-05-26

## 2020-05-26 RX ORDER — SIMVASTATIN 20 MG/1
TABLET, FILM COATED ORAL
Qty: 90 TABLET | Refills: 2 | Status: SHIPPED | OUTPATIENT
Start: 2020-05-26

## 2020-08-13 ENCOUNTER — OFFICE VISIT (OUTPATIENT)
Dept: INTERNAL MEDICINE | Facility: CLINIC | Age: 75
End: 2020-08-13
Payer: MEDICARE

## 2020-08-13 VITALS
OXYGEN SATURATION: 99 % | TEMPERATURE: 98.3 F | WEIGHT: 205 LBS | BODY MASS INDEX: 35 KG/M2 | SYSTOLIC BLOOD PRESSURE: 128 MMHG | HEIGHT: 64 IN | HEART RATE: 77 BPM | RESPIRATION RATE: 16 BRPM | DIASTOLIC BLOOD PRESSURE: 82 MMHG

## 2020-08-13 DIAGNOSIS — E78.2 MIXED HYPERLIPIDEMIA: ICD-10-CM

## 2020-08-13 DIAGNOSIS — G60.9 IDIOPATHIC PERIPHERAL NEUROPATHY: Primary | ICD-10-CM

## 2020-08-13 DIAGNOSIS — I10 ESSENTIAL HYPERTENSION: ICD-10-CM

## 2020-08-13 PROCEDURE — 99214 OFFICE O/P EST MOD 30 MIN: CPT | Performed by: INTERNAL MEDICINE

## 2020-08-13 ASSESSMENT — ENCOUNTER SYMPTOMS
ARTHRALGIAS: 0
HEADACHES: 0
MYALGIAS: 0
SHORTNESS OF BREATH: 0

## 2020-08-13 NOTE — PROGRESS NOTES
"Subjective      Patient ID: Concepcion Romero is a 75 y.o. female.  1945      HPI    The patient presents for routine followup. She will be moving to [place] next month to be closer to her daughter and grandchildren. She had a fall several months ago and decided she needed to be close to family. She felt that her balance was getting better with PT but she had to stop due to COVID.       Review of Systems   Respiratory: Negative for shortness of breath.    Cardiovascular: Negative for chest pain.   Musculoskeletal: Negative for arthralgias and myalgias.   Neurological: Negative for headaches.       Objective     Vitals:    08/13/20 1441   BP: 128/82   BP Location: Left upper arm   Patient Position: Sitting   Pulse: 77   Resp: 16   Temp: 36.8 °C (98.3 °F)   TempSrc: Temporal   SpO2: 99%   Weight: 93 kg (205 lb)   Height: 1.626 m (5' 4\")     Body mass index is 35.19 kg/m².     Past Medical History:   Diagnosis Date   • Arthritis    • Basal cell carcinoma     On nose   • Colon cancer (CMS/HCC)     Diagnosed at age 72   • HBP (high blood pressure)    • High cholesterol    • Insomnia    • Melanoma (CMS/HCC)     ON back   • Neuropathy    • Screening for breast cancer     Annual mammograms     Past Surgical History:   Procedure Laterality Date   • BOWEL RESECTION     • CATARACT EXTRACTION     • COLONOSCOPY      2 total colonoscopy    • MOHS SURGERY  03/2018   • TUBAL LIGATION       Family History   Problem Relation Age of Onset   • Heart disease Biological Mother    • Heart disease Biological Father    • Heart disease Biological Brother      Social History     Socioeconomic History   • Marital status:      Spouse name: Not on file   • Number of children: Not on file   • Years of education: Not on file   • Highest education level: Not on file   Occupational History   • Not on file   Social Needs   • Financial resource strain: Not on file   • Food insecurity:     Worry: Not on file     Inability: Not on file   • " Transportation needs:     Medical: Not on file     Non-medical: Not on file   Tobacco Use   • Smoking status: Former Smoker     Packs/day: 1.00     Last attempt to quit: 1985     Years since quittin.6   • Smokeless tobacco: Never Used   • Tobacco comment: from age [age]   Substance and Sexual Activity   • Alcohol use: Yes     Comment: 2 glases wine on fri and sat, 1 glass on    • Drug use: No   • Sexual activity: Not on file   Lifestyle   • Physical activity:     Days per week: Not on file     Minutes per session: Not on file   • Stress: Not on file   Relationships   • Social connections:     Talks on phone: Not on file     Gets together: Not on file     Attends L[employer] service: Not on file     Active member of club or organization: Not on file     Attends meetings of clubs or organizations: Not on file     Relationship status: Not on file   • Intimate partner violence:     Fear of current or ex partner: Not on file     Emotionally abused: Not on file     Physically abused: Not on file     Forced sexual activity: Not on file   Other Topics Concern   • Not on file   Social History Narrative   • Not on file       Current Outpatient Medications:   •  B-complex with vitamin C tablet, Take 1 tablet by mouth daily., Disp: , Rfl:   •  bisoprolol (ZEBETA) 5 mg tablet, TAKE 1 TABLET DAILY, Disp: 90 tablet, Rfl: 2  •  fluticasone propionate (FLONASE) 50 mcg/actuation nasal spray, Administer 1 spray into each nostril daily., Disp: 1 Bottle, Rfl: 5  •  gabapentin (NEURONTIN) 300 mg capsule, Take 2 capsules (600 mg total) by mouth nightly., Disp: 180 capsule, Rfl: 1  •  ibuprofen (MOTRIN) 200 mg tablet, Take 400 mg by mouth 2 (two) times a day. 2 tabs in am and 2 tabs in pm , Disp: , Rfl:   •  multivit-min-FA-lycopen-lutein tablet, Take 1 tablet by mouth daily., Disp: , Rfl:   •  simvastatin (ZOCOR) 20 mg tablet, TAKE 1 TABLET DAILY, Disp: 90 tablet, Rfl: 2  •  triamterene-hydrochlorothiazide (MAXZIDE-25) 37.5-25 mg  per tablet, TAKE 1 TABLET ONCE DAILY, Disp: 90 tablet, Rfl: 2  •  zolpidem (AMBIEN) 5 mg tablet, Take 1 tablet (5 mg total) by mouth nightly as needed for sleep., Disp: 30 tablet, Rfl: 2  Allergies   Allergen Reactions   • Ciprofloxacin Itching   • Cymbalta [Duloxetine] Other (see comments)     HAD EVERY SIDE EFFECT.  PT WILL NOT TAKE THIS AGAIN.         Physical Exam   Constitutional: She is oriented to person, place, and time. She appears well-developed and well-nourished. No distress.   HENT:   Right Ear: External ear normal.   Left Ear: External ear normal.   Cardiovascular: Normal rate, regular rhythm and normal heart sounds. Exam reveals no gallop and no friction rub.   No murmur heard.  Pulmonary/Chest: Effort normal and breath sounds normal. No respiratory distress. She has no wheezes. She has no rales.   Neurological: She is alert and oriented to person, place, and time. Coordination normal.   Skin: Skin is warm.   Psychiatric: She has a normal mood and affect. Her behavior is normal. Judgment and thought content normal.       Assessment/Plan   Diagnoses and all orders for this visit:    Idiopathic peripheral neuropathy (Primary): Recommend that she do PT once she arrives in [place]. Continue gabapentin as prescribed.  -     Ambulatory referral to Physical Therapy; Future    Essential hypertension: Controlled, continue current management.    Mixed hyperlipidemia: Mildly elevated on last check but for now continue zocor, may consider switch to other statin in the future but will defer to new PCP.

## 2020-08-27 ENCOUNTER — TELEPHONE (OUTPATIENT)
Dept: INTERNAL MEDICINE | Facility: CLINIC | Age: 75
End: 2020-08-27

## 2020-09-04 ENCOUNTER — TRANSCRIBE ORDERS (OUTPATIENT)
Dept: LAB | Age: 75
End: 2020-09-04

## 2020-09-04 ENCOUNTER — APPOINTMENT (OUTPATIENT)
Dept: LAB | Age: 75
End: 2020-09-04
Attending: INTERNAL MEDICINE
Payer: MEDICARE

## 2020-09-04 DIAGNOSIS — C18.0 MALIGNANT NEOPLASM OF CECUM (CMS/HCC): ICD-10-CM

## 2020-09-04 DIAGNOSIS — D50.9 IRON DEFICIENCY ANEMIA, UNSPECIFIED: ICD-10-CM

## 2020-09-04 DIAGNOSIS — C18.0 MALIGNANT NEOPLASM OF CECUM (CMS/HCC): Primary | ICD-10-CM

## 2020-09-04 LAB
ALBUMIN SERPL-MCNC: 3.9 G/DL (ref 3.4–5)
ALP SERPL-CCNC: 51 IU/L (ref 35–126)
ALT SERPL-CCNC: 32 IU/L (ref 11–54)
ANION GAP SERPL CALC-SCNC: 12 MEQ/L (ref 3–15)
AST SERPL-CCNC: 32 IU/L (ref 15–41)
BASOPHILS # BLD: 0.03 K/UL (ref 0.01–0.1)
BASOPHILS NFR BLD: 0.4 %
BILIRUB SERPL-MCNC: 0.9 MG/DL (ref 0.3–1.2)
BUN SERPL-MCNC: 8 MG/DL (ref 8–20)
CALCIUM SERPL-MCNC: 9.6 MG/DL (ref 8.9–10.3)
CEA SERPL-MCNC: 1.4 NG/ML
CHLORIDE SERPL-SCNC: 91 MEQ/L (ref 98–109)
CO2 SERPL-SCNC: 24 MEQ/L (ref 22–32)
CREAT SERPL-MCNC: 0.9 MG/DL (ref 0.6–1.1)
DIFFERENTIAL METHOD BLD: ABNORMAL
EOSINOPHIL # BLD: 0.07 K/UL (ref 0.04–0.36)
EOSINOPHIL NFR BLD: 1 %
ERYTHROCYTE [DISTWIDTH] IN BLOOD BY AUTOMATED COUNT: 12.3 % (ref 11.7–14.4)
GFR SERPL CREATININE-BSD FRML MDRD: >60 ML/MIN/1.73M*2
GLUCOSE SERPL-MCNC: 106 MG/DL (ref 70–99)
HCT VFR BLDCO AUTO: 41.9 % (ref 35–45)
HGB BLD-MCNC: 14.5 G/DL (ref 11.8–15.7)
IMM GRANULOCYTES # BLD AUTO: 0.03 K/UL (ref 0–0.08)
IMM GRANULOCYTES NFR BLD AUTO: 0.4 %
LYMPHOCYTES # BLD: 1.58 K/UL (ref 1.2–3.5)
LYMPHOCYTES NFR BLD: 22.2 %
MCH RBC QN AUTO: 31.8 PG (ref 28–33.2)
MCHC RBC AUTO-ENTMCNC: 34.6 G/DL (ref 32.2–35.5)
MCV RBC AUTO: 91.9 FL (ref 83–98)
MONOCYTES # BLD: 0.72 K/UL (ref 0.28–0.8)
MONOCYTES NFR BLD: 10.1 %
NEUTROPHILS # BLD: 4.69 K/UL (ref 1.7–7)
NEUTS SEG NFR BLD: 65.9 %
NRBC BLD-RTO: 0 %
PDW BLD AUTO: 9.2 FL (ref 9.4–12.3)
PLATELET # BLD AUTO: 222 K/UL (ref 150–369)
POTASSIUM SERPL-SCNC: 3.8 MEQ/L (ref 3.6–5.1)
PROT SERPL-MCNC: 5.9 G/DL (ref 6–8.2)
RBC # BLD AUTO: 4.56 M/UL (ref 3.93–5.22)
SODIUM SERPL-SCNC: 127 MEQ/L (ref 136–144)
WBC # BLD AUTO: 7.12 K/UL (ref 3.8–10.5)

## 2020-09-04 PROCEDURE — 82378 CARCINOEMBRYONIC ANTIGEN: CPT

## 2020-09-04 PROCEDURE — 85025 COMPLETE CBC W/AUTO DIFF WBC: CPT

## 2020-09-04 PROCEDURE — 80053 COMPREHEN METABOLIC PANEL: CPT

## 2020-09-04 PROCEDURE — 36415 COLL VENOUS BLD VENIPUNCTURE: CPT

## 2020-09-08 ENCOUNTER — HOSPITAL ENCOUNTER (OUTPATIENT)
Dept: RADIOLOGY | Age: 75
Discharge: HOME | End: 2020-09-08
Attending: INTERNAL MEDICINE
Payer: MEDICARE

## 2020-09-08 DIAGNOSIS — C18.0 MALIGNANT NEOPLASM OF CECUM (CMS/HCC): ICD-10-CM

## 2020-09-08 PROCEDURE — 63600105 HC IODINE BASED CONTRAST: Performed by: INTERNAL MEDICINE

## 2020-09-08 PROCEDURE — 25500000 HC DRUGS/INCIDENT RAD: Performed by: INTERNAL MEDICINE

## 2020-09-08 PROCEDURE — 71260 CT THORAX DX C+: CPT

## 2020-09-08 RX ADMIN — BARIUM SULFATE 900 ML: 21 SUSPENSION ORAL at 11:21

## 2020-09-08 RX ADMIN — IOHEXOL 145 ML: 350 INJECTION, SOLUTION INTRAVENOUS at 12:40

## 2020-10-05 RX ORDER — GABAPENTIN 300 MG/1
CAPSULE ORAL
Qty: 180 CAPSULE | Refills: 1 | Status: SHIPPED | OUTPATIENT
Start: 2020-10-05

## 2021-05-10 ENCOUNTER — IMPORTED ENCOUNTER (OUTPATIENT)
Dept: URBAN - METROPOLITAN AREA CLINIC 38 | Facility: CLINIC | Age: 76
End: 2021-05-10

## 2021-05-10 PROBLEM — H43.813 VITREOUS DEGENERATION, BILATERAL: Noted: 2021-05-10

## 2021-05-10 PROBLEM — H47.321 DRUSEN OF OPTIC DISC, RIGHT EYE: Noted: 2021-05-10

## 2021-05-10 PROBLEM — H04.123 TEAR FILM INSUFFICIENCY OF BILATERAL LACRIMAL GLANDS: Noted: 2021-05-10

## 2021-05-10 PROBLEM — H26.493 POSTERIOR CAPSULE OPACITY -     OU: Noted: 2021-05-10

## 2021-05-10 PROCEDURE — 92004 COMPRE OPH EXAM NEW PT 1/>: CPT

## 2021-05-19 ENCOUNTER — TELEPHONE (OUTPATIENT)
Dept: NEUROLOGY | Facility: CLINIC | Age: 76
End: 2021-05-19

## 2021-05-19 NOTE — TELEPHONE ENCOUNTER
Pt calling asking if a records release form can be printed and mailed to her new address in [place]?

## 2022-06-18 ASSESSMENT — KERATOMETRY
OS_K1POWER_DIOPTERS: 43.50
OS_K2POWER_DIOPTERS: 44.50
OD_AXISANGLE2_DEGREES: 64
OD_AXISANGLE_DEGREES: 154
OS_AXISANGLE2_DEGREES: 96
OD_K1POWER_DIOPTERS: 43.25
OS_AXISANGLE_DEGREES: 6
OD_K2POWER_DIOPTERS: 44.25

## 2022-06-18 ASSESSMENT — VISUAL ACUITY
OS_BAT: 20/200
OD_CC: 20/20-1
OS_CC: 20/30
OD_CC: J1
OS_CC: J1
OD_BAT: 20/60

## 2022-06-18 ASSESSMENT — TONOMETRY
OD_IOP_MMHG: 10
OS_IOP_MMHG: 12

## (undated) DEVICE — CONNECTOR PORT W/VALVE FOR OLYMPUS 160/180 SCOPE